# Patient Record
Sex: MALE | Race: WHITE | NOT HISPANIC OR LATINO | Employment: OTHER | URBAN - METROPOLITAN AREA
[De-identification: names, ages, dates, MRNs, and addresses within clinical notes are randomized per-mention and may not be internally consistent; named-entity substitution may affect disease eponyms.]

---

## 2018-12-27 RX ORDER — PRAVASTATIN SODIUM 10 MG
10 TABLET ORAL DAILY
COMMUNITY
Start: 2011-11-02 | End: 2021-04-05

## 2018-12-27 RX ORDER — BLOOD SUGAR DIAGNOSTIC
STRIP MISCELLANEOUS
COMMUNITY
Start: 2011-08-31

## 2018-12-27 RX ORDER — LORAZEPAM 1 MG/1
TABLET ORAL
COMMUNITY
Start: 2011-11-13 | End: 2019-01-09 | Stop reason: CLARIF

## 2018-12-27 RX ORDER — ESCITALOPRAM OXALATE 20 MG/1
1 TABLET ORAL DAILY
COMMUNITY
Start: 2011-11-13 | End: 2021-12-15 | Stop reason: ALTCHOICE

## 2018-12-27 RX ORDER — LISINOPRIL 20 MG/1
20 TABLET ORAL DAILY
COMMUNITY
Start: 2011-10-04 | End: 2021-04-05

## 2018-12-27 RX ORDER — TIMOLOL MALEATE 5 MG/ML
SOLUTION/ DROPS OPHTHALMIC
COMMUNITY
Start: 2011-09-07 | End: 2019-01-09 | Stop reason: CLARIF

## 2019-01-09 ENCOUNTER — OFFICE VISIT (OUTPATIENT)
Dept: CARDIOLOGY CLINIC | Facility: CLINIC | Age: 69
End: 2019-01-09
Payer: COMMERCIAL

## 2019-01-09 VITALS
DIASTOLIC BLOOD PRESSURE: 88 MMHG | SYSTOLIC BLOOD PRESSURE: 148 MMHG | HEART RATE: 57 BPM | WEIGHT: 143.3 LBS | BODY MASS INDEX: 23.88 KG/M2 | HEIGHT: 65 IN

## 2019-01-09 DIAGNOSIS — I51.81 TAKOTSUBO CARDIOMYOPATHY: Primary | ICD-10-CM

## 2019-01-09 DIAGNOSIS — I70.8 ATHEROSCLEROSIS OF ILIAC ARTERY: ICD-10-CM

## 2019-01-09 DIAGNOSIS — Z98.890 HISTORY OF CARDIAC CATH: ICD-10-CM

## 2019-01-09 DIAGNOSIS — I10 ESSENTIAL HYPERTENSION: ICD-10-CM

## 2019-01-09 PROCEDURE — 99214 OFFICE O/P EST MOD 30 MIN: CPT | Performed by: INTERNAL MEDICINE

## 2019-01-09 PROCEDURE — 93000 ELECTROCARDIOGRAM COMPLETE: CPT | Performed by: INTERNAL MEDICINE

## 2019-01-09 RX ORDER — INSULIN GLARGINE 300 U/ML
INJECTION, SOLUTION SUBCUTANEOUS
COMMUNITY
Start: 2018-12-09 | End: 2020-11-16

## 2019-01-09 RX ORDER — METFORMIN HYDROCHLORIDE 500 MG/1
1000 TABLET, EXTENDED RELEASE ORAL 2 TIMES DAILY WITH MEALS
COMMUNITY
Start: 2018-11-06 | End: 2021-04-05

## 2019-01-09 RX ORDER — LATANOPROST 50 UG/ML
1 SOLUTION/ DROPS OPHTHALMIC
COMMUNITY
End: 2020-09-02 | Stop reason: SDUPTHER

## 2019-01-09 RX ORDER — INSULIN ASPART 100 [IU]/ML
INJECTION, SOLUTION INTRAVENOUS; SUBCUTANEOUS
COMMUNITY
Start: 2018-12-31 | End: 2020-08-03 | Stop reason: SDUPTHER

## 2019-01-09 NOTE — PROGRESS NOTES
Cardiology Consultation      Jai Comeramp  1950  [id number]  [facility] Roseanna Easton [address]  [facility]    Reason: h/o takotsubo cm,   Requesting: Stacey Wallace MD    1  Takotsubo cardiomyopathy     2  Essential hypertension  POCT ECG   3  Atherosclerosis of iliac artery     4  History of cardiac cath          History:     Patient is a 61-year-old male referred for consultation with history of takotsubo cardiomyopathy 2010 where he experienced an episode of chest discomfort after running out of insulin  Cardiac catheterization showed no epicardial obstruction and was diagnosed with stress-induced cardiomyopathy/takotsubo cardiomyopathy  He has followed with the stents since then  Ejection fraction has been in the low 50% range with no significant valvular heart disease  Patient recently underwent stress testing September 2018 and by report suggestive of inferior scar with ejection fraction 51%  Echocardiogram performed that same day did not show any evidence of wall motion abnormality with ejection fraction 51%  He has had iliac duplex in the past showing mild atherosclerotic disease  He has had no claudication  He has essential hypertension and takes metformin for noninsulin requiring diabetes mellitus  He is an ex-smoker and quit in 2017  He takes low-dose pravastatin for hyperlipidemia  Admits to occasional fatigue, doesn't exercise routinely, walks dog on occasion        Patient Active Problem List   Diagnosis    Takotsubo cardiomyopathy    Essential hypertension    History of cardiac cath    Atherosclerosis of iliac artery     Past Medical History:   Diagnosis Date    Diabetes mellitus ([facility] )     Heart murmur     Hyperlipidemia     Peripheral vascular disease ([facility] )     Stroke [facility])      Social History     Social History    Marital status: /Civil Union     Spouse name: N/A    Number of children: N/A    Years of education: N/A     Occupational History    Not on file  Social History Main Topics    Smoking status: Former Smoker     Packs/day: 1 00     Types: Cigarettes    Smokeless tobacco: Never Used      Comment: 1 PPW    Alcohol use Yes    Drug use: Unknown    Sexual activity: Not on file     Other Topics Concern    Not on file     Social History Narrative    No narrative on file      Family History   Problem Relation Age of Onset    Cancer Father         lymphoma, brain, lung & liver    Mental illness Father     Depression Father     Depression Sister     Depression Brother     Stroke Maternal Grandfather     Stroke Paternal Grandmother     Diabetes Paternal Grandmother     Heart attack Paternal Grandmother      No past surgical history on file      Current Outpatient Prescriptions:     aspirin 81 MG tablet, Take 1 tablet by mouth daily, Disp: , Rfl:     escitalopram (LEXAPRO) 20 mg tablet, Take 1 tablet by mouth daily, Disp: , Rfl:     glucose blood (ANTONETTE CONTOUR TEST) test strip, by In Vitro route, Disp: , Rfl:     insulin detemir (LEVEMIR) 100 units/mL subcutaneous injection, Inject under the skin, Disp: , Rfl:     Insulin Syringe-Needle U-100 (RELION INSULIN SYR 0 3ML/31G) 31G X 5/16" 0 3 ML MISC, by Does not apply route, Disp: , Rfl:     latanoprost (XALATAN) 0 005 % ophthalmic solution, 1 drop daily at bedtime, Disp: , Rfl:     lisinopril (ZESTRIL) 20 mg tablet, Take 20 mg by mouth daily  , Disp: , Rfl:     metFORMIN (GLUCOPHAGE-XR) 500 mg 24 hr tablet, 1,000 mg 2 (two) times a day with meals  , Disp: , Rfl:     metoprolol tartrate (LOPRESSOR) 25 mg tablet, Take 25 mg by mouth every 12 (twelve) hours  , Disp: , Rfl:     NOVOLOG FLEXPEN 100 units/mL injection pen, , Disp: , Rfl:     pravastatin (PRAVACHOL) 10 mg tablet, Take 10 mg by mouth daily  , Disp: , Rfl:     TOUJEO SOLOSTAR 300 units/mL CONCETRATED U-300 injection pen, , Disp: , Rfl:   Allergies Allergen Reactions    Penicillins Anaphylaxis     Anaphylaxis  Reaction Date: 28Jun2007;        Social, Family and medication history as listed, reviewed and updated as necessary    Labs: No results found for: NA, K, CL, CO2, BUN, CREATININE, GLUCOSE, CALCIUM    No results found for: WBC, HGB, HCT, PLT    No results found for: CHOL  No results found for: HDL  No results found for: LDLCALC  No results found for: TRIG  No results found for: LDLDIRECT    No results found for: ALT, AST          No results found for: NTBNP    No results found for: HGBA1C    Imaging: Reviewed in epic      Review of Systems:  14 systems reviewed and negative with exception of the above         PHYSICAL EXAM:        Vitals:    01/09/19 1409   BP: 148/88   Pulse: 57     Body mass index is 23 85 kg/m²  Weight (last 2 days)     Date/Time   Weight    01/09/19 1409  65 (143 3)                 Gen: No acute distress  HEENT: anicteric, mucous membranes moist  Neck: supple, no jugular venous distention, or carotid bruit  Heart: regular, normal s1 and s2, no murmur/rub or gallop  Lungs :clear to auscultation bilaterally, no rales/rhonchi or wheeze  Abdomen: soft nontender, normoactive bowel sounds, no organomegaly  Ext: warm and perfused, normal femoral pulses, no edema, or clubbing  Skin: warm, no rashes  Neuro: AAO x 3, no focal findings  Psychiatric: normal affect  Musculoskeletal: no obvious joint deformities  Discussion/Summary:      1  H/o Takotsubo CM 2010  A  Cath [facility] 2010 no epicardial obstruction    2  Insulin requiring DM    3  Recent nuclear stress with inferior scar, likely artifactual (echo normal wall motion)    4  Mild iliac atherosclerosis, per [facility] vascular report for AAA screen      Plan:  Patient has been doing clinically well since 2010 where he presented with Takotsubo's cardiomyopathy  Cardiac catheterization no epicardial coronary obstruction    He has had surveillance stress testing since that time was recently showing ejection fraction in the 50-55% range  Official report with no axis to the images show inferior scar parentheses likely artifactual on the basis of the echocardiogram reading showing no area of hypokinesis in this territory and with no history of epicardial coronary obstruction)  He is asymptomatic  Encouraged continued sensible/Mediterranean type diet and will follow-up imaging next year  Will try to obtain labs regarding cholesterol status as he is only on pravastatin 10 mg daily but prefers to keep since he has had no side effects and states his cholesterol is well controlled

## 2019-01-17 ENCOUNTER — HOSPITAL ENCOUNTER (OUTPATIENT)
Dept: NON INVASIVE DIAGNOSTICS | Facility: HOSPITAL | Age: 69
Discharge: HOME | End: 2019-01-17
Payer: COMMERCIAL

## 2019-01-17 DIAGNOSIS — I51.81 TAKOTSUBO CARDIOMYOPATHY: ICD-10-CM

## 2019-01-17 DIAGNOSIS — I10 ESSENTIAL HYPERTENSION: ICD-10-CM

## 2019-01-17 PROCEDURE — 93306 TTE W/DOPPLER COMPLETE: CPT

## 2019-01-18 PROCEDURE — 93306 TTE W/DOPPLER COMPLETE: CPT | Performed by: INTERNAL MEDICINE

## 2020-08-03 DIAGNOSIS — E10.9 TYPE 1 DIABETES MELLITUS WITHOUT COMPLICATION (HCC): ICD-10-CM

## 2020-08-03 DIAGNOSIS — E10.9 TYPE 1 DIABETES MELLITUS WITHOUT COMPLICATION (HCC): Primary | ICD-10-CM

## 2020-08-03 RX ORDER — INSULIN ASPART 100 [IU]/ML
INJECTION, SOLUTION INTRAVENOUS; SUBCUTANEOUS
Qty: 1 PEN | Refills: 0 | Status: SHIPPED | OUTPATIENT
Start: 2020-08-03 | End: 2021-05-21 | Stop reason: SDUPTHER

## 2020-08-03 RX ORDER — INSULIN ASPART 100 [IU]/ML
INJECTION, SOLUTION INTRAVENOUS; SUBCUTANEOUS
Qty: 3 PEN | Refills: 0 | Status: SHIPPED | OUTPATIENT
Start: 2020-08-03 | End: 2020-08-03 | Stop reason: SDUPTHER

## 2020-08-14 RX ORDER — PEN NEEDLE, DIABETIC 31 GX5/16"
NEEDLE, DISPOSABLE MISCELLANEOUS
COMMUNITY
Start: 2020-08-10 | End: 2020-09-02 | Stop reason: SDUPTHER

## 2020-08-31 RX ORDER — BLOOD PRESSURE TEST KIT
KIT MISCELLANEOUS
Qty: 100 EACH | OUTPATIENT
Start: 2020-08-31

## 2020-08-31 RX ORDER — BLOOD-GLUCOSE CONTROL, LOW
EACH MISCELLANEOUS
Qty: 1 EACH | OUTPATIENT
Start: 2020-08-31

## 2020-09-01 RX ORDER — LATANOPROST 50 UG/ML
SOLUTION/ DROPS OPHTHALMIC
Qty: 3 ML | OUTPATIENT
Start: 2020-09-01

## 2020-09-02 DIAGNOSIS — Z79.4 TYPE 2 DIABETES MELLITUS WITH HYPERGLYCEMIA, WITH LONG-TERM CURRENT USE OF INSULIN (HCC): Primary | ICD-10-CM

## 2020-09-02 DIAGNOSIS — E11.65 TYPE 2 DIABETES MELLITUS WITH HYPERGLYCEMIA, WITH LONG-TERM CURRENT USE OF INSULIN (HCC): Primary | ICD-10-CM

## 2020-09-02 DIAGNOSIS — H40.9 GLAUCOMA OF BOTH EYES, UNSPECIFIED GLAUCOMA TYPE: ICD-10-CM

## 2020-09-02 RX ORDER — PEN NEEDLE, DIABETIC 31 GX5/16"
NEEDLE, DISPOSABLE MISCELLANEOUS
Qty: 300 EACH | Refills: 11 | Status: SHIPPED | OUTPATIENT
Start: 2020-09-02 | End: 2021-10-04

## 2020-09-02 RX ORDER — GLUCOSAMINE HCL/CHONDROITIN SU 500-400 MG
CAPSULE ORAL
Qty: 300 EACH | Refills: 11 | Status: SHIPPED | OUTPATIENT
Start: 2020-09-02 | End: 2021-10-28

## 2020-09-02 RX ORDER — LATANOPROST 50 UG/ML
1 SOLUTION/ DROPS OPHTHALMIC
Qty: 7.5 ML | Refills: 1 | Status: SHIPPED | OUTPATIENT
Start: 2020-09-02

## 2020-10-21 DIAGNOSIS — E10.9 TYPE 1 DIABETES MELLITUS WITHOUT COMPLICATION (HCC): ICD-10-CM

## 2020-10-21 DIAGNOSIS — E11.65 TYPE 2 DIABETES MELLITUS WITH HYPERGLYCEMIA, WITH LONG-TERM CURRENT USE OF INSULIN (HCC): Primary | ICD-10-CM

## 2020-10-21 DIAGNOSIS — Z79.4 TYPE 2 DIABETES MELLITUS WITH HYPERGLYCEMIA, WITH LONG-TERM CURRENT USE OF INSULIN (HCC): Primary | ICD-10-CM

## 2020-10-22 RX ORDER — BLOOD SUGAR DIAGNOSTIC
STRIP MISCELLANEOUS
Qty: 400 EACH | Refills: 11 | Status: SHIPPED | OUTPATIENT
Start: 2020-10-22 | End: 2021-08-20 | Stop reason: SDUPTHER

## 2020-11-16 DIAGNOSIS — E10.9 TYPE 1 DIABETES MELLITUS WITHOUT COMPLICATION (HCC): Primary | ICD-10-CM

## 2020-11-16 RX ORDER — INSULIN GLARGINE 300 U/ML
INJECTION, SOLUTION SUBCUTANEOUS
Qty: 3 PEN | Refills: 0 | Status: SHIPPED | OUTPATIENT
Start: 2020-11-16 | End: 2021-05-21

## 2021-02-26 DIAGNOSIS — H40.9 GLAUCOMA OF BOTH EYES, UNSPECIFIED GLAUCOMA TYPE: ICD-10-CM

## 2021-02-26 RX ORDER — LATANOPROST 50 UG/ML
1 SOLUTION/ DROPS OPHTHALMIC
OUTPATIENT
Start: 2021-02-26

## 2021-04-03 DIAGNOSIS — Z79.4 TYPE 2 DIABETES MELLITUS WITH HYPERGLYCEMIA, WITH LONG-TERM CURRENT USE OF INSULIN (HCC): Primary | ICD-10-CM

## 2021-04-03 DIAGNOSIS — E11.65 TYPE 2 DIABETES MELLITUS WITH HYPERGLYCEMIA, WITH LONG-TERM CURRENT USE OF INSULIN (HCC): Primary | ICD-10-CM

## 2021-04-05 RX ORDER — PRAVASTATIN SODIUM 10 MG
TABLET ORAL
Qty: 90 TABLET | Refills: 1 | Status: SHIPPED | OUTPATIENT
Start: 2021-04-05 | End: 2021-10-28

## 2021-04-05 RX ORDER — LISINOPRIL 20 MG/1
TABLET ORAL
Qty: 90 TABLET | Refills: 1 | Status: SHIPPED | OUTPATIENT
Start: 2021-04-05 | End: 2021-10-28

## 2021-04-05 RX ORDER — METFORMIN HYDROCHLORIDE 500 MG/1
TABLET, EXTENDED RELEASE ORAL
Qty: 360 TABLET | Refills: 1 | Status: SHIPPED | OUTPATIENT
Start: 2021-04-05 | End: 2021-10-28

## 2021-04-20 DIAGNOSIS — I10 ESSENTIAL HYPERTENSION: Primary | ICD-10-CM

## 2021-04-20 NOTE — TELEPHONE ENCOUNTER
Patient is requesting a refill to local and to [facility]   However patient has not been seen in over 1 year     So should really only go tolocal to get him through until appointment in May

## 2021-05-17 ENCOUNTER — RA CDI HCC (OUTPATIENT)
Dept: OTHER | Facility: HOSPITAL | Age: 71
End: 2021-05-17

## 2021-05-17 NOTE — PROGRESS NOTES
[facility]  coding opportunities             Chart reviewed, (number of) suggestions sent to provider: 1           Patients insurance company: [employer] (Medicare Advantage only)        DX: [id number] Type 2 diabetes mellitus with diabetic peripheral angiopathy without gangrene     Provider never responded to [place] [id number]  coding request and DX was not used

## 2021-05-21 ENCOUNTER — OFFICE VISIT (OUTPATIENT)
Dept: FAMILY MEDICINE CLINIC | Facility: CLINIC | Age: 71
End: 2021-05-21
Payer: COMMERCIAL

## 2021-05-21 VITALS
SYSTOLIC BLOOD PRESSURE: 128 MMHG | BODY MASS INDEX: 22.82 KG/M2 | DIASTOLIC BLOOD PRESSURE: 82 MMHG | WEIGHT: 137 LBS | HEIGHT: 65 IN

## 2021-05-21 DIAGNOSIS — E53.8 B12 DEFICIENCY: ICD-10-CM

## 2021-05-21 DIAGNOSIS — Z12.5 SCREENING FOR PROSTATE CANCER: ICD-10-CM

## 2021-05-21 DIAGNOSIS — Z12.11 SCREENING FOR MALIGNANT NEOPLASM OF COLON: ICD-10-CM

## 2021-05-21 DIAGNOSIS — I10 ESSENTIAL HYPERTENSION: ICD-10-CM

## 2021-05-21 DIAGNOSIS — Z79.899 OTHER LONG TERM (CURRENT) DRUG THERAPY: ICD-10-CM

## 2021-05-21 DIAGNOSIS — Z11.59 ENCOUNTER FOR HEPATITIS C SCREENING TEST FOR LOW RISK PATIENT: ICD-10-CM

## 2021-05-21 DIAGNOSIS — E10.9 TYPE 1 DIABETES MELLITUS WITHOUT COMPLICATION (HCC): ICD-10-CM

## 2021-05-21 DIAGNOSIS — E55.9 VITAMIN D DEFICIENCY: ICD-10-CM

## 2021-05-21 DIAGNOSIS — Z00.00 WELL ADULT EXAM: Primary | ICD-10-CM

## 2021-05-21 PROCEDURE — 3288F FALL RISK ASSESSMENT DOCD: CPT | Performed by: FAMILY MEDICINE

## 2021-05-21 PROCEDURE — G0439 PPPS, SUBSEQ VISIT: HCPCS | Performed by: FAMILY MEDICINE

## 2021-05-21 PROCEDURE — 1125F AMNT PAIN NOTED PAIN PRSNT: CPT | Performed by: FAMILY MEDICINE

## 2021-05-21 PROCEDURE — 3008F BODY MASS INDEX DOCD: CPT | Performed by: FAMILY MEDICINE

## 2021-05-21 PROCEDURE — 1160F RVW MEDS BY RX/DR IN RCRD: CPT | Performed by: FAMILY MEDICINE

## 2021-05-21 PROCEDURE — 99214 OFFICE O/P EST MOD 30 MIN: CPT | Performed by: FAMILY MEDICINE

## 2021-05-21 PROCEDURE — 1170F FXNL STATUS ASSESSED: CPT | Performed by: FAMILY MEDICINE

## 2021-05-21 PROCEDURE — 3725F SCREEN DEPRESSION PERFORMED: CPT | Performed by: FAMILY MEDICINE

## 2021-05-21 RX ORDER — INSULIN GLARGINE 300 U/ML
40 INJECTION, SOLUTION SUBCUTANEOUS
Qty: 6 ML | Refills: 11 | Status: SHIPPED | OUTPATIENT
Start: 2021-05-21 | End: 2022-06-30

## 2021-05-21 RX ORDER — INSULIN ASPART 100 [IU]/ML
INJECTION, SOLUTION INTRAVENOUS; SUBCUTANEOUS
Qty: 9 ML | Refills: 11 | Status: SHIPPED | OUTPATIENT
Start: 2021-05-21 | End: 2022-05-02

## 2021-05-21 NOTE — PROGRESS NOTES
Assessment and Plan:     Problem List Items Addressed This Visit        Cardiovascular and Mediastinum    Essential hypertension    Relevant Medications    metoprolol tartrate (LOPRESSOR) 25 mg tablet      Other Visit Diagnoses     Well adult exam    -  Primary    Type 1 diabetes mellitus without complication (HCC)        Relevant Medications    insulin glargine (Toujeo SoloStar) 300 units/mL CONCENTRATED U-300 injection pen (1-unit dial)    NovoLOG FlexPen 100 units/mL injection pen    Other Relevant Orders    TSH, 3rd generation with Free T4 reflex    Lipid panel    Comprehensive metabolic panel    CBC    UA w Reflex to Microscopic w Reflex to Culture    Hemoglobin A1C    Microalbumin / creatinine urine ratio    B12 deficiency        Relevant Orders    Vitamin B12    Vitamin D deficiency        Relevant Orders    Vitamin D 25 hydroxy    Other long term (current) drug therapy        Relevant Orders    Hepatitis C antibody    Encounter for hepatitis C screening test for low risk patient        Screening for malignant neoplasm of colon        Relevant Orders    Ambulatory referral for colonoscopy    Screening for prostate cancer        Relevant Orders    PSA, Total Screen           Preventive health issues were discussed with patient, and age appropriate screening tests were ordered as noted in patient's After Visit Summary  Personalized health advice and appropriate referrals for health education or preventive services given if needed, as noted in patient's After Visit Summary       History of Present Illness:     Patient presents for Medicare Annual Wellness visit    Patient Care Team:  Chaitanya Greene MD as PCP - General     Problem List:     Patient Active Problem List   Diagnosis    Takotsubo cardiomyopathy    Essential hypertension    History of cardiac cath    Atherosclerosis of iliac artery      Past Medical and Surgical History:     Past Medical History:   Diagnosis Date    Anxiety     Arthritis     Depression     Diabetes mellitus [facility]     Heart attack ([facility]     Heart murmur     Hepatitis     Hyperlipidemia     Palpitations     Peripheral vascular disease (HCC)     Stroke [facility])      Past Surgical History:   Procedure Laterality Date    HERNIA REPAIR      KNEE SURGERY Right 1977    Tendon      Family History:     Family History   Problem Relation Age of Onset    Cancer Father         lymphoma, brain, lung & liver    Mental illness Father     Depression Father     Depression Sister     Depression Brother     Stroke Maternal Grandfather     Stroke Paternal Grandmother     Diabetes Paternal Grandmother     Heart attack Paternal Grandmother     Depression Brother       Social History:        Social History     Socioeconomic History    Marital status: /Civil Union     Spouse name: None    Number of children: None    Years of education: None    Highest education level: None   Occupational History    Occupation: Retired    Social Needs    Financial resource strain: None    Food insecurity     Worry: None     Inability: None    Transportation needs     Medical: None     Non-medical: None   Tobacco Use    Smoking status: Former Smoker     Packs/day: 1 00     Years: 50 00     Pack years: 50 00     Types: Cigarettes    Smokeless tobacco: Never Used    Tobacco comment: 1 PPW   Substance and Sexual Activity    Alcohol use:  Yes    Drug use: None     Comment: Denied    Sexual activity: None   Lifestyle    Physical activity     Days per week: None     Minutes per session: None    Stress: None   Relationships    Social connections     Talks on phone: None     Gets together: None     Attends Yazidism service: None     Active member of club or organization: None     Attends meetings of clubs or organizations: None     Relationship status: None    Intimate partner violence     Fear of current or ex partner: None     Emotionally abused: None     Physically abused: None     Forced sexual activity: None   Other Topics Concern    None   Social History Narrative    Most recent tobacco use screenin2020    Do you currently or have you served in the [employer]: No    Were you activated, into active duty, as a member of the [employer] or as a Reservist: No    Marital status: Single    Alcohol intake: Occasional    Live alone or with others: alone    High cholesterol: Yes    High blood pressure: Yes    Exercise level: None    low level    Chewing tobacco: none    Overweight: No    Obese: No    Diabetes: Yes    General stress level: Low    Diet: Diabetic    Occupation: Retired    Advance directive: No    Caffeine intake:  Moderate    1-2 cups of coffee, No soda, occasional Ice tea    Guns present in home: No    Seat belts used routinely: No    Sunscreen used routinely: No    Seat belt/car seat used routinely: Yes    Do you have regular medical check ups: Yes    Do you have regular dental check ups: Yes    Education: 12    Illicit drugs: Denied    Smoke alarm in home: Yes    Salt Intake: Normal Use    Has the Patient had a mammogram to screen for breast cancer within 24 months: No    Would the patient like to schedule a Mammogram: No      Medications and Allergies:     Current Outpatient Medications   Medication Sig Dispense Refill    Accu-Chek Marya Plus test strip TEST BLOOD GLUCOSE FOUR TIMES DAILY 400 each 11    Alcohol Swabs 70 % PADS Use when injecting qid 300 each 11    aspirin 81 MG tablet Take 1 tablet by mouth daily      Droplet Pen Needles 31G X 8 MM MISC Use to injected qid 300 each 11    escitalopram (LEXAPRO) 20 mg tablet Take 1 tablet by mouth daily      insulin glargine (Toujeo SoloStar) 300 units/mL CONCENTRATED U-300 injection pen (1-unit dial) Inject 40 Units under the skin daily at bedtime 6 mL 11    Insulin Syringe-Needle U-100 (RELION INSULIN SYR 0 3ML/31G) 31G X 5/16" 0 3 ML MISC by Does not apply route      latanoprost (XALATAN) 0 005 % ophthalmic solution Administer 1 drop to both eyes daily at bedtime 7 5 mL 1    lisinopril (ZESTRIL) 20 mg tablet TAKE 1 TABLET EVERY DAY 90 tablet 1    metFORMIN (GLUCOPHAGE-XR) 500 mg 24 hr tablet TAKE 2 TABLETS TWICE DAILY 360 tablet 1    metoprolol tartrate (LOPRESSOR) 25 mg tablet Take 1 tablet (25 mg total) by mouth every 12 (twelve) hours 14 tablet 0    NovoLOG FlexPen 100 units/mL injection pen 15 units 3 times daily 9 mL 11    pravastatin (PRAVACHOL) 10 mg tablet TAKE 1 TABLET EVERY DAY 90 tablet 1     No current facility-administered medications for this visit  Allergies   Allergen Reactions    Penicillins Anaphylaxis     Anaphylaxis  Reaction Date: 28Jun2007;       Immunizations:     Immunization History   Administered Date(s) Administered    INFLUENZA 11/20/2014    Influenza, seasonal, injectable 11/05/2007    Pneumococcal Polysaccharide PPV23 02/14/2018    SARS-CoV-2 / COVID-19 mRNA IM (Pfizer-BioNTech) 04/21/2021      Health Maintenance:         Topic Date Due    Hepatitis C Screening  Never done    Colorectal Cancer Screening  Never done         Topic Date Due    COVID-19 Vaccine (2 - Pfizer 2-dose series) 05/12/2021      Medicare Health Risk Assessment:     /82 (BP Location: Left arm, Patient Position: Sitting, Cuff Size: Large)   Ht 5' 5" (1 651 m)   Wt 62 1 kg (137 lb)   BMI 22 80 kg/m²      Pranav Velasquez is here for his Subsequent Wellness visit  Last Medicare Wellness visit information reviewed, patient interviewed and updates made to the record today  Health Risk Assessment:   Patient rates overall health as good  Patient feels that their physical health rating is same  Patient is satisfied with their life  Eyesight was rated as same  Hearing was rated as same  Patient feels that their emotional and mental health rating is same  Patients states they are never, rarely angry  Patient states they are sometimes unusually tired/fatigued   Pain experienced in the last 7 days has been some  Patient's pain rating has been 3/10  Patient states that he has experienced no weight loss or gain in last 6 months  Depression Screening:   PHQ-2 Score: 0      Fall Risk Screening: In the past year, patient has experienced: no history of falling in past year      Home Safety:  Patient does not have trouble with stairs inside or outside of their home  Patient has working smoke alarms Home safety hazards include: none  Medications:   Patient is currently taking over-the-counter supplements  OTC medications include: see medication list  Patient is able to manage medications  Activities of Daily Living (ADLs)/Instrumental Activities of Daily Living (IADLs):   Walk and transfer into and out of bed and chair?: Yes  Dress and groom yourself?: Yes    Bathe or shower yourself?: Yes    Feed yourself? Yes  Do your laundry/housekeeping?: Yes  Manage your money, pay your bills and track your expenses?: Yes  Make your own meals?: Yes    Do your own shopping?: Yes    Advance Care Planning:     Five wishes given: Yes      Cognitive Screening:   Provider or family/friend/caregiver concerned regarding cognition?: No    PREVENTIVE SCREENINGS      Cardiovascular Screening:      Due for: Lipid Panel      Diabetes Screening:     General: Screening Not Indicated and History Diabetes    Due for: Blood Glucose      Colorectal Cancer Screening:       Due for: Colonoscopy - Low Risk      Prostate Cancer Screening:      Due for: PSA      Osteoporosis Screening:    General: Screening Not Indicated      Abdominal Aortic Aneurysm (AAA) Screening:    Risk factors include: age between 73-69 yo and tobacco use        Lung Cancer Screening:     General: Patient Declines    Due for: Low Dose CT (LDCT)      Hepatitis C Screening:    General: Patient Declines    Screening, Brief Intervention, and Referral to Treatment (SBIRT)      Brief Intervention  Alcohol & drug use screenings were reviewed   No concerns regarding substance use disorder identified         Pradeep Stewart MD

## 2021-05-21 NOTE — PATIENT INSTRUCTIONS
Medicare Preventive Visit Patient Instructions  Thank you for completing your Welcome to Medicare Visit or Medicare Annual Wellness Visit today  Your next wellness visit will be due in one year (5/22/2022)  The screening/preventive services that you may require over the next 5-10 years are detailed below  Some tests may not apply to you based off risk factors and/or age  Screening tests ordered at today's visit but not completed yet may show as past due  Also, please note that scanned in results may not display below  Preventive Screenings:  Service Recommendations Previous Testing/Comments   Colorectal Cancer Screening  · Colonoscopy    · Fecal Occult Blood Test (FOBT)/Fecal Immunochemical Test (FIT)  · Fecal DNA/Cologuard Test  · Flexible Sigmoidoscopy Age: 54-65 years old   Colonoscopy: every 10 years (May be performed more frequently if at higher risk)  OR  FOBT/FIT: every 1 year  OR  Cologuard: every 3 years  OR  Sigmoidoscopy: every 5 years  Screening may be recommended earlier than age 48 if at higher risk for colorectal cancer  Also, an individualized decision between you and your healthcare provider will decide whether screening between the ages of 74-80 would be appropriate   Colonoscopy: Not on file  FOBT/FIT: Not on file  Cologuard: Not on file  Sigmoidoscopy: Not on file          Prostate Cancer Screening Individualized decision between patient and health care provider in men between ages of 53-78   Medicare will cover every 12 months beginning on the day after your 50th birthday PSA: No results in last 5 years           Hepatitis C Screening Once for adults born between [birth year] and 1965  More frequently in patients at high risk for Hepatitis C Hep C Antibody: Not on file        Diabetes Screening 1-2 times per year if you're at risk for diabetes or have pre-diabetes Fasting glucose: No results in last 5 years   A1C: No results in last 5 years    Screening Not Indicated  History Diabetes   Cholesterol Screening Once every 5 years if you don't have a lipid disorder  May order more often based on risk factors  Lipid panel: Not on file           Other Preventive Screenings Covered by Medicare:  1  Abdominal Aortic Aneurysm (AAA) Screening: covered once if your at risk  You're considered to be at risk if you have a family history of AAA or a male between the age of 73-68 who smoking at least 100 cigarettes in your lifetime  2  Lung Cancer Screening: covers low dose CT scan once per year if you meet all of the following conditions: (1) Age 50-69; (2) No signs or symptoms of lung cancer; (3) Current smoker or have quit smoking within the last 15 years; (4) You have a tobacco smoking history of at least 30 pack years (packs per day x number of years you smoked); (5) You get a written order from a healthcare provider  3  Glaucoma Screening: covered annually if you're considered high risk: (1) You have diabetes OR (2) Family history of glaucoma OR (3)  aged 48 and older OR (3)  American aged 72 and older  3  Osteoporosis Screening: covered every 2 years if you meet one of the following conditions: (1) Have a vertebral abnormality; (2) On glucocorticoid therapy for more than 3 months; (3) Have primary hyperparathyroidism; (4) On osteoporosis medications and need to assess response to drug therapy  5  HIV Screening: covered annually if you're between the age of 12-76  Also covered annually if you are younger than 13 and older than 72 with risk factors for HIV infection  For pregnant patients, it is covered up to 3 times per pregnancy      Immunizations:  Immunization Recommendations   Influenza Vaccine Annual influenza vaccination during flu season is recommended for all persons aged >= 6 months who do not have contraindications   Pneumococcal Vaccine (Prevnar and Pneumovax)  * Prevnar = PCV13  * Pneumovax = PPSV23 Adults 25-60 years old: 1-3 doses may be recommended based on certain risk factors  Adults 72 years old: Prevnar (PCV13) vaccine recommended followed by Pneumovax (PPSV23) vaccine  If already received PPSV23 since turning 65, then PCV13 recommended at least one year after PPSV23 dose  Hepatitis B Vaccine 3 dose series if at intermediate or high risk (ex: diabetes, end stage renal disease, liver disease)   Tetanus (Td) Vaccine - COST NOT COVERED BY MEDICARE PART B Following completion of primary series, a booster dose should be given every 10 years to maintain immunity against tetanus  Td may also be given as tetanus wound prophylaxis  Tdap Vaccine - COST NOT COVERED BY MEDICARE PART B Recommended at least once for all adults  For pregnant patients, recommended with each pregnancy  Shingles Vaccine (Shingrix) - COST NOT COVERED BY MEDICARE PART B  2 shot series recommended in those aged 48 and above     Health Maintenance Due:      Topic Date Due    Hepatitis C Screening  Never done    Colorectal Cancer Screening  Never done     Immunizations Due:      Topic Date Due    COVID-19 Vaccine (2 - Pfizer 2-dose series) 05/12/2021     Advance Directives   What are advance directives? Advance directives are legal documents that state your wishes and plans for medical care  These plans are made ahead of time in case you lose your ability to make decisions for yourself  Advance directives can apply to any medical decision, such as the treatments you want, and if you want to donate organs  What are the types of advance directives? There are many types of advance directives, and each state has rules about how to use them  You may choose a combination of any of the following:  · Living will: This is a written record of the treatment you want  You can also choose which treatments you do not want, which to limit, and which to stop at a certain time  This includes surgery, medicine, IV fluid, and tube feedings  · Durable power of  for healthcare [place] [facility]):   This is a written record that states who you want to make healthcare choices for you when you are unable to make them for yourself  This person, called a proxy, is usually a family member or a friend  You may choose more than 1 proxy  · Do not resuscitate (DNR) order:  A DNR order is used in case your heart stops beating or you stop breathing  It is a request not to have certain forms of treatment, such as CPR  A DNR order may be included in other types of advance directives  · Medical directive: This covers the care that you want if you are in a coma, near death, or unable to make decisions for yourself  You can list the treatments you want for each condition  Treatment may include pain medicine, surgery, blood transfusions, dialysis, IV or tube feedings, and a ventilator (breathing machine)  · Values history: This document has questions about your views, beliefs, and how you feel and think about life  This information can help others choose the care that you would choose  Why are advance directives important? An advance directive helps you control your care  Although spoken wishes may be used, it is better to have your wishes written down  Spoken wishes can be misunderstood, or not followed  Treatments may be given even if you do not want them  An advance directive may make it easier for your family to make difficult choices about your care  © Copyright [employer] 2018 Information is for End User's use only and may not be sold, redistributed or otherwise used for commercial purposes   All illustrations and images included in CareNotes® are the copyrighted property of [employer]  or [address] [place] [address] [employer]

## 2021-05-21 NOTE — PROGRESS NOTES
Assessment/Plan:    1  Type 1 diabetes mellitus without complication (HCC)  -     TSH, 3rd generation with Free T4 reflex; Future; Expected date: 05/21/2021  -     Lipid panel; Future; Expected date: 05/21/2021  -     Comprehensive metabolic panel; Future; Expected date: 05/21/2021  -     CBC; Future; Expected date: 05/21/2021  -     UA w Reflex to Microscopic w Reflex to Culture; Future; Expected date: 05/21/2021  -     Hemoglobin A1C; Future; Expected date: 05/21/2021  -     Microalbumin / creatinine urine ratio  -     insulin glargine (Toujeo SoloStar) 300 units/mL CONCENTRATED U-300 injection pen (1-unit dial); Inject 40 Units under the skin daily at bedtime  -     NovoLOG FlexPen 100 units/mL injection pen; 15 units 3 times daily    2  B12 deficiency  -     Vitamin B12; Future; Expected date: 05/21/2021    3  Vitamin D deficiency  -     Vitamin D 25 hydroxy; Future; Expected date: 05/21/2021    4  Other long term (current) drug therapy  -     Hepatitis C antibody; Future    5  Encounter for hepatitis C screening test for low risk patient    6  Screening for malignant neoplasm of colon  -     Ambulatory referral for colonoscopy; Future    7  Screening for prostate cancer  -     PSA, Total Screen; Future         Subjective:      Patient ID: Renzo Valencia is a 78 y o  male  HPI   DM 1  On basal bolus       The following portions of the patient's history were reviewed and updated as appropriate: allergies, current medications, past family history, past medical history, past social history, past surgical history and problem list     Review of Systems   Constitutional: Negative for activity change, appetite change and fever  HENT: Negative for congestion, nosebleeds and trouble swallowing  Eyes: Negative for itching  Respiratory: Negative for cough and chest tightness  Cardiovascular: Negative for chest pain and palpitations     Gastrointestinal: Negative for abdominal pain, constipation, diarrhea and nausea  Endocrine: Negative for cold intolerance  Genitourinary: Negative for frequency  Musculoskeletal: Negative for gait problem and joint swelling  Skin: Negative for rash  Allergic/Immunologic: Negative for immunocompromised state  Neurological: Negative for dizziness, tremors, seizures, syncope and headaches  Psychiatric/Behavioral: Negative for hallucinations and suicidal ideas  Objective:      /82 (BP Location: Left arm, Patient Position: Sitting, Cuff Size: Large)   Ht 5' 5" (1 651 m)   Wt 62 1 kg (137 lb)   BMI 22 80 kg/m²     No visits with results within 2 Week(s) from this visit  Latest known visit with results is:   No results found for any previous visit  Physical Exam  Vitals signs and nursing note reviewed  Constitutional:       General: He is not in acute distress  Appearance: He is well-developed  HENT:      Head: Normocephalic and atraumatic  Neck:      Musculoskeletal: Normal range of motion and neck supple  Cardiovascular:      Rate and Rhythm: Normal rate and regular rhythm  Pulses: no weak pulses          Dorsalis pedis pulses are 2+ on the right side and 2+ on the left side  Heart sounds: Normal heart sounds  No murmur  Pulmonary:      Effort: Pulmonary effort is normal       Breath sounds: Normal breath sounds  No wheezing or rales  Abdominal:      General: Bowel sounds are normal  There is no distension  Palpations: Abdomen is soft  Tenderness: There is no abdominal tenderness  There is no guarding or rebound  Musculoskeletal: Normal range of motion  General: No tenderness  Feet:      Right foot:      Skin integrity: Callus and dry skin present  No ulcer, skin breakdown, erythema or warmth  Left foot:      Skin integrity: Callus and dry skin present  No ulcer, skin breakdown, erythema or warmth  Lymphadenopathy:      Cervical: No cervical adenopathy  Skin:     General: Skin is warm and dry  Capillary Refill: Capillary refill takes less than 2 seconds  Findings: No rash  Neurological:      Mental Status: He is alert and oriented to person, place, and time  Cranial Nerves: No cranial nerve deficit  Sensory: No sensory deficit  Motor: No abnormal muscle tone  Psychiatric:         Behavior: Behavior normal          Thought Content: Thought content normal          Judgment: Judgment normal          BMI Counseling: Body mass index is 22 8 kg/m²  The BMI is above normal  Nutrition recommendations include decreasing portion sizes, encouraging healthy choices of fruits and vegetables and limiting drinks that contain sugar  Exercise recommendations include moderate physical activity 150 minutes/week  No pharmacotherapy was ordered  Falls Plan of Care: balance, strength, and gait training instructions were provided  Medications that increase falls were reviewed  Patient's shoes and socks removed  Right Foot/Ankle   Right Foot Inspection  Skin Exam: skin normal, skin intact, dry skin, callus and callus no warmth, no erythema, no maceration, no abnormal color, no pre-ulcer and no ulcer                          Toe Exam: ROM and strength within normal limits  Sensory       Monofilament testing: intact  Vascular  Capillary refills: < 3 seconds  The right DP pulse is 2+  Left Foot/Ankle  Left Foot Inspection  Skin Exam: skin normal, skin intact, dry skin and callusno warmth, no erythema, no maceration, normal color, no pre-ulcer and no ulcer                         Toe Exam: ROM and strength within normal limits                   Sensory       Monofilament: intact  Vascular  Capillary refills: < 3 seconds  The left DP pulse is 2+  Assign Risk Category:  No deformity present; No loss of protective sensation;  No weak pulses       Risk: Winnie Ace 90, MD [place] 41

## 2021-07-06 ENCOUNTER — TELEPHONE (OUTPATIENT)
Dept: GASTROENTEROLOGY | Facility: CLINIC | Age: 71
End: 2021-07-06

## 2021-07-06 NOTE — TELEPHONE ENCOUNTER
Received order from Dr Indra Jeffers to call and schedule as a new patient colon screening  I left message for patient to call and schedule  Will call him again in 1 week if he doesn't return call

## 2021-07-13 DIAGNOSIS — I10 ESSENTIAL HYPERTENSION: ICD-10-CM

## 2021-07-13 NOTE — TELEPHONE ENCOUNTER
Patient called stating that he is completely out of his Metoprolol 25mg    He will need a short supply sent to [facility] and a 90 day sent to [facility]

## 2021-07-13 NOTE — TELEPHONE ENCOUNTER
Called and spoke with patient  Stated he is having difficult time of [employer] and his doctor working together to get prescription and as soon as he gets that resolved he will take care of all his appointments  Will wait for patient to call

## 2021-07-29 DIAGNOSIS — I10 ESSENTIAL HYPERTENSION: ICD-10-CM

## 2021-08-20 DIAGNOSIS — E10.9 TYPE 1 DIABETES MELLITUS WITHOUT COMPLICATION (HCC): ICD-10-CM

## 2021-08-20 RX ORDER — BLOOD SUGAR DIAGNOSTIC
1 STRIP MISCELLANEOUS 4 TIMES DAILY
Qty: 400 EACH | Refills: 11 | Status: SHIPPED | OUTPATIENT
Start: 2021-08-20 | End: 2021-08-20 | Stop reason: SDUPTHER

## 2021-08-20 RX ORDER — BLOOD SUGAR DIAGNOSTIC
1 STRIP MISCELLANEOUS 4 TIMES DAILY
Qty: 120 EACH | Refills: 0 | Status: SHIPPED | OUTPATIENT
Start: 2021-08-20

## 2021-08-20 RX ORDER — BLOOD SUGAR DIAGNOSTIC
1 STRIP MISCELLANEOUS 4 TIMES DAILY
Qty: 120 EACH | Refills: 0 | Status: SHIPPED | OUTPATIENT
Start: 2021-08-20 | End: 2021-08-20 | Stop reason: SDUPTHER

## 2021-08-20 NOTE — TELEPHONE ENCOUNTER
A  Number 90 was sent to [facility] and a number 30 was sent to [facility]

## 2021-09-07 DIAGNOSIS — E10.9 TYPE 1 DIABETES MELLITUS WITHOUT COMPLICATION (HCC): ICD-10-CM

## 2021-09-07 RX ORDER — BLOOD SUGAR DIAGNOSTIC
STRIP MISCELLANEOUS
Qty: 400 STRIP | Refills: 11 | Status: SHIPPED | OUTPATIENT
Start: 2021-09-07

## 2021-09-16 ENCOUNTER — RA CDI HCC (OUTPATIENT)
Dept: OTHER | Facility: HOSPITAL | Age: 71
End: 2021-09-16

## 2021-09-16 NOTE — PROGRESS NOTES
[facility] 75  coding opportunities       Chart reviewed, no opportunity found: CHART REVIEWED, NO OPPORTUNITY FOUND                        Patients insurance company: [facility], [facility]  - [facility] only)

## 2021-10-02 DIAGNOSIS — E11.65 TYPE 2 DIABETES MELLITUS WITH HYPERGLYCEMIA, WITH LONG-TERM CURRENT USE OF INSULIN (HCC): ICD-10-CM

## 2021-10-02 DIAGNOSIS — Z79.4 TYPE 2 DIABETES MELLITUS WITH HYPERGLYCEMIA, WITH LONG-TERM CURRENT USE OF INSULIN (HCC): ICD-10-CM

## 2021-10-04 RX ORDER — PEN NEEDLE, DIABETIC 31 GX5/16"
NEEDLE, DISPOSABLE MISCELLANEOUS
Qty: 400 EACH | Refills: 1 | Status: SHIPPED | OUTPATIENT
Start: 2021-10-04 | End: 2022-03-23

## 2021-11-15 ENCOUNTER — RA CDI HCC (OUTPATIENT)
Dept: OTHER | Facility: HOSPITAL | Age: 71
End: 2021-11-15

## 2021-11-18 DIAGNOSIS — Z79.4 TYPE 2 DIABETES MELLITUS WITH HYPERGLYCEMIA, WITH LONG-TERM CURRENT USE OF INSULIN (HCC): ICD-10-CM

## 2021-11-18 DIAGNOSIS — E11.65 TYPE 2 DIABETES MELLITUS WITH HYPERGLYCEMIA, WITH LONG-TERM CURRENT USE OF INSULIN (HCC): ICD-10-CM

## 2021-11-19 RX ORDER — ISOPROPYL ALCOHOL 0.75 G/1
SWAB TOPICAL
Qty: 200 EACH | Refills: 1 | Status: SHIPPED | OUTPATIENT
Start: 2021-11-19 | End: 2022-02-16

## 2021-11-29 DIAGNOSIS — I10 ESSENTIAL HYPERTENSION: ICD-10-CM

## 2021-11-29 DIAGNOSIS — E11.65 TYPE 2 DIABETES MELLITUS WITH HYPERGLYCEMIA, WITH LONG-TERM CURRENT USE OF INSULIN (HCC): ICD-10-CM

## 2021-11-29 DIAGNOSIS — Z79.4 TYPE 2 DIABETES MELLITUS WITH HYPERGLYCEMIA, WITH LONG-TERM CURRENT USE OF INSULIN (HCC): ICD-10-CM

## 2021-11-29 RX ORDER — METFORMIN HYDROCHLORIDE 500 MG/1
TABLET, EXTENDED RELEASE ORAL
Qty: 180 TABLET | Refills: 0 | Status: SHIPPED | OUTPATIENT
Start: 2021-11-29 | End: 2022-01-05

## 2021-12-08 ENCOUNTER — APPOINTMENT (OUTPATIENT)
Dept: LAB | Facility: CLINIC | Age: 71
End: 2021-12-08
Payer: COMMERCIAL

## 2021-12-08 DIAGNOSIS — Z12.5 SCREENING FOR PROSTATE CANCER: ICD-10-CM

## 2021-12-08 DIAGNOSIS — E55.9 VITAMIN D DEFICIENCY: ICD-10-CM

## 2021-12-08 DIAGNOSIS — E10.9 TYPE 1 DIABETES MELLITUS WITHOUT COMPLICATION (HCC): ICD-10-CM

## 2021-12-08 DIAGNOSIS — E53.8 B12 DEFICIENCY: ICD-10-CM

## 2021-12-08 DIAGNOSIS — Z79.899 OTHER LONG TERM (CURRENT) DRUG THERAPY: ICD-10-CM

## 2021-12-08 LAB
25(OH)D3 SERPL-MCNC: 13.2 NG/ML (ref 30–100)
ALBUMIN SERPL BCP-MCNC: 3.5 G/DL (ref 3.5–5)
ALP SERPL-CCNC: 31 U/L (ref 46–116)
ALT SERPL W P-5'-P-CCNC: 47 U/L (ref 12–78)
ANION GAP SERPL CALCULATED.3IONS-SCNC: 5 MMOL/L (ref 4–13)
AST SERPL W P-5'-P-CCNC: 20 U/L (ref 5–45)
BACTERIA UR QL AUTO: NORMAL /HPF
BILIRUB SERPL-MCNC: 0.42 MG/DL (ref 0.2–1)
BILIRUB UR QL STRIP: NEGATIVE
BUN SERPL-MCNC: 14 MG/DL (ref 5–25)
CALCIUM SERPL-MCNC: 8.7 MG/DL (ref 8.3–10.1)
CHLORIDE SERPL-SCNC: 105 MMOL/L (ref 100–108)
CHOLEST SERPL-MCNC: 117 MG/DL
CLARITY UR: CLEAR
CO2 SERPL-SCNC: 28 MMOL/L (ref 21–32)
COLOR UR: YELLOW
CREAT SERPL-MCNC: 0.76 MG/DL (ref 0.6–1.3)
CREAT UR-MCNC: 108 MG/DL
ERYTHROCYTE [DISTWIDTH] IN BLOOD BY AUTOMATED COUNT: 14 % (ref 11.6–15.1)
EST. AVERAGE GLUCOSE BLD GHB EST-MCNC: 235 MG/DL
GFR SERPL CREATININE-BSD FRML MDRD: 94 ML/MIN/1.73SQ M
GLUCOSE P FAST SERPL-MCNC: 234 MG/DL (ref 65–99)
GLUCOSE UR STRIP-MCNC: ABNORMAL MG/DL
HBA1C MFR BLD: 9.8 %
HCT VFR BLD AUTO: 38.7 % (ref 36.5–49.3)
HCV AB SER QL: ABNORMAL
HDLC SERPL-MCNC: 36 MG/DL
HGB BLD-MCNC: 12.3 G/DL (ref 12–17)
HGB UR QL STRIP.AUTO: NEGATIVE
HYALINE CASTS #/AREA URNS LPF: NORMAL /LPF
KETONES UR STRIP-MCNC: NEGATIVE MG/DL
LDLC SERPL CALC-MCNC: 45 MG/DL (ref 0–100)
LEUKOCYTE ESTERASE UR QL STRIP: ABNORMAL
MCH RBC QN AUTO: 31.2 PG (ref 26.8–34.3)
MCHC RBC AUTO-ENTMCNC: 31.8 G/DL (ref 31.4–37.4)
MCV RBC AUTO: 98 FL (ref 82–98)
MICROALBUMIN UR-MCNC: 12.9 MG/L (ref 0–20)
MICROALBUMIN/CREAT 24H UR: 12 MG/G CREATININE (ref 0–30)
NITRITE UR QL STRIP: NEGATIVE
NON-SQ EPI CELLS URNS QL MICRO: NORMAL /HPF
NONHDLC SERPL-MCNC: 81 MG/DL
PH UR STRIP.AUTO: 5.5 [PH]
PLATELET # BLD AUTO: 334 THOUSANDS/UL (ref 149–390)
PMV BLD AUTO: 11 FL (ref 8.9–12.7)
POTASSIUM SERPL-SCNC: 4.5 MMOL/L (ref 3.5–5.3)
PROT SERPL-MCNC: 6.6 G/DL (ref 6.4–8.2)
PROT UR STRIP-MCNC: NEGATIVE MG/DL
PSA SERPL-MCNC: 1.6 NG/ML (ref 0–4)
RBC # BLD AUTO: 3.94 MILLION/UL (ref 3.88–5.62)
RBC #/AREA URNS AUTO: NORMAL /HPF
SODIUM SERPL-SCNC: 138 MMOL/L (ref 136–145)
SP GR UR STRIP.AUTO: 1.02 (ref 1–1.03)
TRIGL SERPL-MCNC: 178 MG/DL
TSH SERPL DL<=0.05 MIU/L-ACNC: 1.54 UIU/ML (ref 0.36–3.74)
UROBILINOGEN UR QL STRIP.AUTO: 0.2 E.U./DL
VIT B12 SERPL-MCNC: 223 PG/ML (ref 100–900)
WBC # BLD AUTO: 8.81 THOUSAND/UL (ref 4.31–10.16)
WBC #/AREA URNS AUTO: NORMAL /HPF

## 2021-12-08 PROCEDURE — 81001 URINALYSIS AUTO W/SCOPE: CPT

## 2021-12-08 PROCEDURE — 82607 VITAMIN B-12: CPT

## 2021-12-08 PROCEDURE — 3046F HEMOGLOBIN A1C LEVEL >9.0%: CPT | Performed by: FAMILY MEDICINE

## 2021-12-08 PROCEDURE — 83036 HEMOGLOBIN GLYCOSYLATED A1C: CPT

## 2021-12-08 PROCEDURE — 84443 ASSAY THYROID STIM HORMONE: CPT

## 2021-12-08 PROCEDURE — 3061F NEG MICROALBUMINURIA REV: CPT | Performed by: FAMILY MEDICINE

## 2021-12-08 PROCEDURE — 80053 COMPREHEN METABOLIC PANEL: CPT

## 2021-12-08 PROCEDURE — 85027 COMPLETE CBC AUTOMATED: CPT

## 2021-12-08 PROCEDURE — 82570 ASSAY OF URINE CREATININE: CPT | Performed by: FAMILY MEDICINE

## 2021-12-08 PROCEDURE — 82306 VITAMIN D 25 HYDROXY: CPT

## 2021-12-08 PROCEDURE — G0103 PSA SCREENING: HCPCS

## 2021-12-08 PROCEDURE — 80061 LIPID PANEL: CPT

## 2021-12-08 PROCEDURE — 82043 UR ALBUMIN QUANTITATIVE: CPT | Performed by: FAMILY MEDICINE

## 2021-12-08 PROCEDURE — 36415 COLL VENOUS BLD VENIPUNCTURE: CPT

## 2021-12-08 PROCEDURE — 86803 HEPATITIS C AB TEST: CPT

## 2021-12-14 ENCOUNTER — OFFICE VISIT (OUTPATIENT)
Dept: FAMILY MEDICINE CLINIC | Facility: CLINIC | Age: 71
End: 2021-12-14
Payer: COMMERCIAL

## 2021-12-14 VITALS
OXYGEN SATURATION: 96 % | HEIGHT: 65 IN | SYSTOLIC BLOOD PRESSURE: 118 MMHG | BODY MASS INDEX: 21.83 KG/M2 | HEART RATE: 74 BPM | WEIGHT: 131 LBS | DIASTOLIC BLOOD PRESSURE: 62 MMHG | RESPIRATION RATE: 16 BRPM

## 2021-12-14 DIAGNOSIS — I10 ESSENTIAL HYPERTENSION: ICD-10-CM

## 2021-12-14 DIAGNOSIS — I51.81 TAKOTSUBO CARDIOMYOPATHY: ICD-10-CM

## 2021-12-14 DIAGNOSIS — R76.8 HEPATITIS C ANTIBODY TEST POSITIVE: Primary | ICD-10-CM

## 2021-12-14 DIAGNOSIS — I70.8 ATHEROSCLEROSIS OF ILIAC ARTERY: ICD-10-CM

## 2021-12-14 DIAGNOSIS — E10.9 TYPE 1 DIABETES MELLITUS WITHOUT COMPLICATION (HCC): ICD-10-CM

## 2021-12-14 PROCEDURE — 99214 OFFICE O/P EST MOD 30 MIN: CPT | Performed by: FAMILY MEDICINE

## 2021-12-14 PROCEDURE — 1160F RVW MEDS BY RX/DR IN RCRD: CPT | Performed by: FAMILY MEDICINE

## 2021-12-14 PROCEDURE — 3078F DIAST BP <80 MM HG: CPT | Performed by: FAMILY MEDICINE

## 2021-12-14 PROCEDURE — 3074F SYST BP LT 130 MM HG: CPT | Performed by: FAMILY MEDICINE

## 2021-12-14 PROCEDURE — 3008F BODY MASS INDEX DOCD: CPT | Performed by: FAMILY MEDICINE

## 2021-12-14 PROCEDURE — 3725F SCREEN DEPRESSION PERFORMED: CPT | Performed by: FAMILY MEDICINE

## 2021-12-14 PROCEDURE — 1036F TOBACCO NON-USER: CPT | Performed by: FAMILY MEDICINE

## 2022-01-05 DIAGNOSIS — E11.65 TYPE 2 DIABETES MELLITUS WITH HYPERGLYCEMIA, WITH LONG-TERM CURRENT USE OF INSULIN (HCC): ICD-10-CM

## 2022-01-05 DIAGNOSIS — Z79.4 TYPE 2 DIABETES MELLITUS WITH HYPERGLYCEMIA, WITH LONG-TERM CURRENT USE OF INSULIN (HCC): ICD-10-CM

## 2022-01-05 RX ORDER — METFORMIN HYDROCHLORIDE 500 MG/1
TABLET, EXTENDED RELEASE ORAL
Qty: 180 TABLET | Refills: 0 | Status: SHIPPED | OUTPATIENT
Start: 2022-01-05 | End: 2022-02-08

## 2022-01-26 DIAGNOSIS — Z79.4 TYPE 2 DIABETES MELLITUS WITH HYPERGLYCEMIA, WITH LONG-TERM CURRENT USE OF INSULIN (HCC): ICD-10-CM

## 2022-01-26 DIAGNOSIS — E11.65 TYPE 2 DIABETES MELLITUS WITH HYPERGLYCEMIA, WITH LONG-TERM CURRENT USE OF INSULIN (HCC): ICD-10-CM

## 2022-01-27 PROCEDURE — 4010F ACE/ARB THERAPY RXD/TAKEN: CPT | Performed by: FAMILY MEDICINE

## 2022-01-27 RX ORDER — LISINOPRIL 20 MG/1
TABLET ORAL
Qty: 90 TABLET | Refills: 0 | Status: SHIPPED | OUTPATIENT
Start: 2022-01-27 | End: 2022-03-31

## 2022-01-27 RX ORDER — PRAVASTATIN SODIUM 10 MG
TABLET ORAL
Qty: 90 TABLET | Refills: 0 | Status: SHIPPED | OUTPATIENT
Start: 2022-01-27 | End: 2022-03-31

## 2022-02-08 DIAGNOSIS — Z79.4 TYPE 2 DIABETES MELLITUS WITH HYPERGLYCEMIA, WITH LONG-TERM CURRENT USE OF INSULIN (HCC): ICD-10-CM

## 2022-02-08 DIAGNOSIS — E11.65 TYPE 2 DIABETES MELLITUS WITH HYPERGLYCEMIA, WITH LONG-TERM CURRENT USE OF INSULIN (HCC): ICD-10-CM

## 2022-02-08 RX ORDER — METFORMIN HYDROCHLORIDE 500 MG/1
TABLET, EXTENDED RELEASE ORAL
Qty: 180 TABLET | Refills: 0 | Status: SHIPPED | OUTPATIENT
Start: 2022-02-08 | End: 2022-04-25

## 2022-02-16 DIAGNOSIS — Z79.4 TYPE 2 DIABETES MELLITUS WITH HYPERGLYCEMIA, WITH LONG-TERM CURRENT USE OF INSULIN (HCC): ICD-10-CM

## 2022-02-16 DIAGNOSIS — E11.65 TYPE 2 DIABETES MELLITUS WITH HYPERGLYCEMIA, WITH LONG-TERM CURRENT USE OF INSULIN (HCC): ICD-10-CM

## 2022-02-16 RX ORDER — ISOPROPYL ALCOHOL 0.75 G/1
SWAB TOPICAL
Qty: 400 EACH | Refills: 1 | Status: SHIPPED | OUTPATIENT
Start: 2022-02-16 | End: 2022-07-11

## 2022-03-23 DIAGNOSIS — Z79.4 TYPE 2 DIABETES MELLITUS WITH HYPERGLYCEMIA, WITH LONG-TERM CURRENT USE OF INSULIN (HCC): ICD-10-CM

## 2022-03-23 DIAGNOSIS — E11.65 TYPE 2 DIABETES MELLITUS WITH HYPERGLYCEMIA, WITH LONG-TERM CURRENT USE OF INSULIN (HCC): ICD-10-CM

## 2022-03-23 RX ORDER — PEN NEEDLE, DIABETIC 31 GX5/16"
NEEDLE, DISPOSABLE MISCELLANEOUS
Qty: 400 EACH | Refills: 1 | Status: SHIPPED | OUTPATIENT
Start: 2022-03-23

## 2022-03-31 DIAGNOSIS — E11.65 TYPE 2 DIABETES MELLITUS WITH HYPERGLYCEMIA, WITH LONG-TERM CURRENT USE OF INSULIN (HCC): ICD-10-CM

## 2022-03-31 DIAGNOSIS — Z79.4 TYPE 2 DIABETES MELLITUS WITH HYPERGLYCEMIA, WITH LONG-TERM CURRENT USE OF INSULIN (HCC): ICD-10-CM

## 2022-03-31 RX ORDER — LISINOPRIL 20 MG/1
TABLET ORAL
Qty: 90 TABLET | Refills: 0 | Status: SHIPPED | OUTPATIENT
Start: 2022-03-31 | End: 2022-07-06

## 2022-03-31 RX ORDER — PRAVASTATIN SODIUM 10 MG
TABLET ORAL
Qty: 90 TABLET | Refills: 0 | Status: SHIPPED | OUTPATIENT
Start: 2022-03-31 | End: 2022-07-06

## 2022-04-12 ENCOUNTER — OFFICE VISIT (OUTPATIENT)
Dept: FAMILY MEDICINE CLINIC | Facility: CLINIC | Age: 72
End: 2022-04-12
Payer: COMMERCIAL

## 2022-04-12 VITALS
SYSTOLIC BLOOD PRESSURE: 116 MMHG | RESPIRATION RATE: 16 BRPM | DIASTOLIC BLOOD PRESSURE: 78 MMHG | OXYGEN SATURATION: 96 % | HEIGHT: 65 IN | BODY MASS INDEX: 21.99 KG/M2 | WEIGHT: 132 LBS | HEART RATE: 55 BPM

## 2022-04-12 DIAGNOSIS — I51.81 TAKOTSUBO CARDIOMYOPATHY: ICD-10-CM

## 2022-04-12 DIAGNOSIS — E11.65 TYPE 2 DIABETES MELLITUS WITH HYPERGLYCEMIA, WITH LONG-TERM CURRENT USE OF INSULIN (HCC): Primary | ICD-10-CM

## 2022-04-12 DIAGNOSIS — Z12.11 SCREENING FOR MALIGNANT NEOPLASM OF COLON: ICD-10-CM

## 2022-04-12 DIAGNOSIS — I10 ESSENTIAL HYPERTENSION: ICD-10-CM

## 2022-04-12 DIAGNOSIS — Z79.4 TYPE 2 DIABETES MELLITUS WITH HYPERGLYCEMIA, WITH LONG-TERM CURRENT USE OF INSULIN (HCC): Primary | ICD-10-CM

## 2022-04-12 PROCEDURE — 3008F BODY MASS INDEX DOCD: CPT | Performed by: FAMILY MEDICINE

## 2022-04-12 PROCEDURE — 1160F RVW MEDS BY RX/DR IN RCRD: CPT | Performed by: FAMILY MEDICINE

## 2022-04-12 PROCEDURE — 99214 OFFICE O/P EST MOD 30 MIN: CPT | Performed by: FAMILY MEDICINE

## 2022-04-12 PROCEDURE — 3078F DIAST BP <80 MM HG: CPT | Performed by: FAMILY MEDICINE

## 2022-04-12 PROCEDURE — 3074F SYST BP LT 130 MM HG: CPT | Performed by: FAMILY MEDICINE

## 2022-04-12 PROCEDURE — 1036F TOBACCO NON-USER: CPT | Performed by: FAMILY MEDICINE

## 2022-04-12 NOTE — PATIENT INSTRUCTIONS
10% - bad control"> 10% - bad control,Hemoglobin A1c (HbA1c) greater than 10% indicating poor diabetic control,Haemoglobin A1c greater than 10% indicating poor diabetic control">   Diabetes Mellitus Type 2 in Adults, Ambulatory Care   GENERAL INFORMATION:   Diabetes mellitus type 2  is a disease that affects how your body uses glucose (sugar)  Insulin helps move sugar out of the blood so it can be used for energy  Normally, when the blood sugar level increases, the pancreas makes more insulin  Type 2 diabetes develops because either the body cannot make enough insulin, or it cannot use the insulin correctly  After many years, your pancreas may stop making insulin  Common symptoms include the following:   · More hunger or thirst than usual     · Frequent urination     · Weight loss without trying     · Blurred vision  Seek immediate care for the following symptoms:   · Severe abdominal pain, or pain that spreads to your back  You may also be vomiting  · Trouble staying awake or focusing    · Shaking or sweating    · Blurred or double vision    · Breath has a fruity, sweet smell    · Breathing is deep and labored, or rapid and shallow    · Heartbeat is fast and weak  Treatment for diabetes mellitus type 2  includes keeping your blood sugar at a normal level  You must eat the right foods, and exercise regularly  You may also need medicine if you cannot control your blood sugar level with nutrition and exercise  Manage diabetes mellitus type 2:   · Check your blood sugar level  You will be taught how to check a small drop of blood in a glucose monitor  Ask your healthcare provider when and how often to check during the day  Ask your healthcare provider what your blood sugar levels should be when you check them  · Keep track of carbohydrates (sugar and starchy foods)  Your blood sugar level can get too high if you eat too many carbohydrates   Your dietitian will help you plan meals and snacks that have the right amount of carbohydrates  · Eat low-fat foods  Some examples are skinless chicken and low-fat milk  · Eat less sodium (salt)  Some examples of high-sodium foods to limit are soy sauce, potato chips, and soup  Do not add salt to food you cook  Limit your use of table salt  · Eat high-fiber foods  Foods that are a good source of fiber include vegetables, whole grain bread, and beans  · Limit alcohol  Alcohol affects your blood sugar level and can make it harder to manage your diabetes  Women should limit alcohol to 1 drink a day  Men should limit alcohol to 2 drinks a day  A drink of alcohol is 12 ounces of beer, 5 ounces of wine, or 1½ ounces of liquor  · Get regular exercise  Exercise can help keep your blood sugar level steady, decrease your risk of heart disease, and help you lose weight  Exercise for at least 30 minutes, 5 days a week  Include muscle strengthening activities 2 days each week  Work with your healthcare provider to create an exercise plan  · Check your feet each day  for injuries or open sores  Ask your healthcare provider for activities you can do if you have an open sore  · Quit smoking  If you smoke, it is never too late to quit  Smoking can worsen the problems that may occur with diabetes  Ask your healthcare provider for information about how to stop smoking if you are having trouble quitting  · Ask about your weight:  Ask healthcare providers if you need to lose weight, and how much to lose  Ask them to help you with a weight loss program  Even a 10 to 15 pound weight loss can help you manage your blood sugar level  · Carry medical alert identification  Wear medical alert jewelry or carry a card that says you have diabetes  Ask your healthcare provider where to get these items  · Ask about vaccines  Diabetes puts you at risk of serious illness if you get the flu, pneumonia, or hepatitis   Ask your healthcare provider if you should get a flu, pneumonia, or hepatitis B vaccine, and when to get the vaccine  Follow up with your healthcare provider as directed:  Write down your questions so you remember to ask them during your visits  CARE AGREEMENT:   You have the right to help plan your care  Learn about your health condition and how it may be treated  Discuss treatment options with your caregivers to decide what care you want to receive  You always have the right to refuse treatment  The above information is an  only  It is not intended as medical advice for individual conditions or treatments  Talk to your doctor, nurse or pharmacist before following any medical regimen to see if it is safe and effective for you  © 2014 [address] is for End User's use only and may not be sold, redistributed or otherwise used for commercial purposes  All illustrations and images included in CareNotes® are the copyrighted property of A D A M , Inc  or [employer]  10% - bad control"> 10% - bad control,Hemoglobin A1c (HbA1c) greater than 10% indicating poor diabetic control,Haemoglobin A1c greater than 10% indicating poor diabetic control">   Diabetes Mellitus Type 2 in Adults, Ambulatory Care   GENERAL INFORMATION:   Diabetes mellitus type 2  is a disease that affects how your body uses glucose (sugar)  Insulin helps move sugar out of the blood so it can be used for energy  Normally, when the blood sugar level increases, the pancreas makes more insulin  Type 2 diabetes develops because either the body cannot make enough insulin, or it cannot use the insulin correctly  After many years, your pancreas may stop making insulin  Common symptoms include the following:   · More hunger or thirst than usual     · Frequent urination     · Weight loss without trying     · Blurred vision  Seek immediate care for the following symptoms:   · Severe abdominal pain, or pain that spreads to your back   You may also be vomiting  · Trouble staying awake or focusing    · Shaking or sweating    · Blurred or double vision    · Breath has a fruity, sweet smell    · Breathing is deep and labored, or rapid and shallow    · Heartbeat is fast and weak  Treatment for diabetes mellitus type 2  includes keeping your blood sugar at a normal level  You must eat the right foods, and exercise regularly  You may also need medicine if you cannot control your blood sugar level with nutrition and exercise  Manage diabetes mellitus type 2:   · Check your blood sugar level  You will be taught how to check a small drop of blood in a glucose monitor  Ask your healthcare provider when and how often to check during the day  Ask your healthcare provider what your blood sugar levels should be when you check them  · Keep track of carbohydrates (sugar and starchy foods)  Your blood sugar level can get too high if you eat too many carbohydrates  Your dietitian will help you plan meals and snacks that have the right amount of carbohydrates  · Eat low-fat foods  Some examples are skinless chicken and low-fat milk  · Eat less sodium (salt)  Some examples of high-sodium foods to limit are soy sauce, potato chips, and soup  Do not add salt to food you cook  Limit your use of table salt  · Eat high-fiber foods  Foods that are a good source of fiber include vegetables, whole grain bread, and beans  · Limit alcohol  Alcohol affects your blood sugar level and can make it harder to manage your diabetes  Women should limit alcohol to 1 drink a day  Men should limit alcohol to 2 drinks a day  A drink of alcohol is 12 ounces of beer, 5 ounces of wine, or 1½ ounces of liquor  · Get regular exercise  Exercise can help keep your blood sugar level steady, decrease your risk of heart disease, and help you lose weight  Exercise for at least 30 minutes, 5 days a week  Include muscle strengthening activities 2 days each week   Work with your healthcare provider to create an exercise plan  · Check your feet each day  for injuries or open sores  Ask your healthcare provider for activities you can do if you have an open sore  · Quit smoking  If you smoke, it is never too late to quit  Smoking can worsen the problems that may occur with diabetes  Ask your healthcare provider for information about how to stop smoking if you are having trouble quitting  · Ask about your weight:  Ask healthcare providers if you need to lose weight, and how much to lose  Ask them to help you with a weight loss program  Even a 10 to 15 pound weight loss can help you manage your blood sugar level  · Carry medical alert identification  Wear medical alert jewelry or carry a card that says you have diabetes  Ask your healthcare provider where to get these items  · Ask about vaccines  Diabetes puts you at risk of serious illness if you get the flu, pneumonia, or hepatitis  Ask your healthcare provider if you should get a flu, pneumonia, or hepatitis B vaccine, and when to get the vaccine  Follow up with your healthcare provider as directed:  Write down your questions so you remember to ask them during your visits  CARE AGREEMENT:   You have the right to help plan your care  Learn about your health condition and how it may be treated  Discuss treatment options with your caregivers to decide what care you want to receive  You always have the right to refuse treatment  The above information is an  only  It is not intended as medical advice for individual conditions or treatments  Talk to your doctor, nurse or pharmacist before following any medical regimen to see if it is safe and effective for you  © 2014 [address] is for End User's use only and may not be sold, redistributed or otherwise used for commercial purposes   All illustrations and images included in CareNotes® are the copyrighted property of [employer]  or Shiv Moscoso

## 2022-04-12 NOTE — PROGRESS NOTES
Assessment/Plan:    Take shot with snacks too   Get the colon cancer screening done   Cont with vit d   Do the labs previously ordered    1  Type 2 diabetes mellitus with hyperglycemia, with long-term current use of insulin (HCC)  -     Microalbumin / creatinine urine ratio    2  Screening for malignant neoplasm of colon  -     Cologuard    3  Takotsubo cardiomyopathy    4  Essential hypertension       Subjective:      Patient ID: Paolo Zaldivar is a 73 y o  male  HPI   Here to go over chronic issues and labs / imaging studies if applicable  IDDM   Vit Ddef   Hep C screen low reactive     25 units long acting   And aprox 15u tid with meals     The following portions of the patient's history were reviewed and updated as appropriate: allergies, current medications, past family history, past medical history, past social history, past surgical history and problem list     Review of Systems   Constitutional: Negative for activity change, appetite change and fever  HENT: Negative for congestion, nosebleeds and trouble swallowing  Eyes: Negative for itching  Respiratory: Negative for cough and chest tightness  Cardiovascular: Negative for chest pain and palpitations  Gastrointestinal: Negative for abdominal pain, constipation, diarrhea and nausea  Endocrine: Negative for cold intolerance  Genitourinary: Negative for frequency  Musculoskeletal: Negative for gait problem and joint swelling  Skin: Negative for rash  Allergic/Immunologic: Negative for immunocompromised state  Neurological: Negative for dizziness, tremors, seizures, syncope and headaches  Psychiatric/Behavioral: Negative for hallucinations and suicidal ideas  Objective:      /78 (BP Location: Left arm, Patient Position: Sitting, Cuff Size: Standard)   Pulse 55   Resp 16   Ht 5' 5" (1 651 m)   Wt 59 9 kg (132 lb)   SpO2 96%   BMI 21 97 kg/m²     No visits with results within 2 Week(s) from this visit     Latest known visit with results is:   Appointment on 12/08/2021   Component Date Value    Hepatitis C Ab 12/08/2021 Low Reactive*    TSH 3RD GENERATON 12/08/2021 1 540     Vit D, 25-Hydroxy 12/08/2021 13 2*    Cholesterol 12/08/2021 117     Triglycerides 12/08/2021 178*    HDL, Direct 12/08/2021 36*    LDL Calculated 12/08/2021 45     Non-HDL-Chol (CHOL-HDL) 12/08/2021 81     Sodium 12/08/2021 138     Potassium 12/08/2021 4 5     Chloride 12/08/2021 105     CO2 12/08/2021 28     ANION GAP 12/08/2021 5     BUN 12/08/2021 14     Creatinine 12/08/2021 0 76     Glucose, Fasting 12/08/2021 234*    Calcium 12/08/2021 8 7     AST 12/08/2021 20     ALT 12/08/2021 47     Alkaline Phosphatase 12/08/2021 31*    Total Protein 12/08/2021 6 6     Albumin 12/08/2021 3 5     Total Bilirubin 12/08/2021 0 42     eGFR 12/08/2021 94     WBC 12/08/2021 8 81     RBC 12/08/2021 3 94     Hemoglobin 12/08/2021 12 3     Hematocrit 12/08/2021 38 7     MCV 12/08/2021 98     MCH 12/08/2021 31 2     MCHC 12/08/2021 31 8     RDW 12/08/2021 14 0     Platelets 92/67/7515 334     MPV 12/08/2021 11 0     Vitamin B-12 12/08/2021 223     Color, UA 12/08/2021 Yellow     Clarity, UA 12/08/2021 Clear     Specific Gravity, UA 12/08/2021 1 021     pH, UA 12/08/2021 5 5     Leukocytes, UA 12/08/2021 Elevated glucose may cause decreased leukocyte values   See urine microscopic for [facility] result/*    Nitrite, UA 12/08/2021 Negative     Protein, UA 12/08/2021 Negative     Glucose, UA 12/08/2021 >=1000 (1%)*    Ketones, UA 12/08/2021 Negative     Urobilinogen, UA 12/08/2021 0 2     Bilirubin, UA 12/08/2021 Negative     Blood, UA 12/08/2021 Negative     Hemoglobin A1C 12/08/2021 9 8*    EAG 12/08/2021 235     PSA 12/08/2021 1 6     RBC, UA 12/08/2021 None Seen     WBC, UA 12/08/2021 None Seen     Epithelial Cells 12/08/2021 None Seen     Bacteria, UA 12/08/2021 None Seen     Hyaline Casts, UA 12/08/2021 None Seen Physical Exam  Vitals and nursing note reviewed  Constitutional:       General: He is not in acute distress  Appearance: He is well-developed  HENT:      Head: Normocephalic and atraumatic  Cardiovascular:      Rate and Rhythm: Normal rate and regular rhythm  Heart sounds: Normal heart sounds  No murmur heard  Pulmonary:      Effort: Pulmonary effort is normal       Breath sounds: Normal breath sounds  No wheezing or rales  Abdominal:      General: Bowel sounds are normal  There is no distension  Palpations: Abdomen is soft  Tenderness: There is no abdominal tenderness  There is no guarding or rebound  Musculoskeletal:         General: No tenderness  Normal range of motion  Cervical back: Normal range of motion and neck supple  Lymphadenopathy:      Cervical: No cervical adenopathy  Skin:     General: Skin is warm and dry  Capillary Refill: Capillary refill takes less than 2 seconds  Findings: No rash  Neurological:      Mental Status: He is alert and oriented to person, place, and time  Cranial Nerves: No cranial nerve deficit  Sensory: No sensory deficit  Motor: No abnormal muscle tone  Psychiatric:         Behavior: Behavior normal          Thought Content:  Thought content normal          Judgment: Judgment normal              Dulce Hutson MD  [place] [id number]

## 2022-04-23 DIAGNOSIS — E11.65 TYPE 2 DIABETES MELLITUS WITH HYPERGLYCEMIA, WITH LONG-TERM CURRENT USE OF INSULIN (HCC): ICD-10-CM

## 2022-04-23 DIAGNOSIS — Z79.4 TYPE 2 DIABETES MELLITUS WITH HYPERGLYCEMIA, WITH LONG-TERM CURRENT USE OF INSULIN (HCC): ICD-10-CM

## 2022-04-25 RX ORDER — METFORMIN HYDROCHLORIDE 500 MG/1
TABLET, EXTENDED RELEASE ORAL
Qty: 180 TABLET | Refills: 0 | Status: SHIPPED | OUTPATIENT
Start: 2022-04-25 | End: 2022-07-05

## 2022-05-02 DIAGNOSIS — E10.9 TYPE 1 DIABETES MELLITUS WITHOUT COMPLICATION (HCC): ICD-10-CM

## 2022-05-02 RX ORDER — INSULIN ASPART 100 [IU]/ML
INJECTION, SOLUTION INTRAVENOUS; SUBCUTANEOUS
Qty: 45 ML | Refills: 1 | Status: SHIPPED | OUTPATIENT
Start: 2022-05-02

## 2022-06-29 DIAGNOSIS — E10.9 TYPE 1 DIABETES MELLITUS WITHOUT COMPLICATION (HCC): ICD-10-CM

## 2022-06-30 RX ORDER — INSULIN GLARGINE 300 U/ML
INJECTION, SOLUTION SUBCUTANEOUS
Qty: 9 ML | Refills: 11 | Status: SHIPPED | OUTPATIENT
Start: 2022-06-30

## 2022-07-04 DIAGNOSIS — E11.65 TYPE 2 DIABETES MELLITUS WITH HYPERGLYCEMIA, WITH LONG-TERM CURRENT USE OF INSULIN (HCC): ICD-10-CM

## 2022-07-04 DIAGNOSIS — Z79.4 TYPE 2 DIABETES MELLITUS WITH HYPERGLYCEMIA, WITH LONG-TERM CURRENT USE OF INSULIN (HCC): ICD-10-CM

## 2022-07-05 RX ORDER — METFORMIN HYDROCHLORIDE 500 MG/1
TABLET, EXTENDED RELEASE ORAL
Qty: 180 TABLET | Refills: 0 | Status: SHIPPED | OUTPATIENT
Start: 2022-07-05 | End: 2022-09-07

## 2022-07-06 DIAGNOSIS — E11.65 TYPE 2 DIABETES MELLITUS WITH HYPERGLYCEMIA, WITH LONG-TERM CURRENT USE OF INSULIN (HCC): ICD-10-CM

## 2022-07-06 DIAGNOSIS — Z79.4 TYPE 2 DIABETES MELLITUS WITH HYPERGLYCEMIA, WITH LONG-TERM CURRENT USE OF INSULIN (HCC): ICD-10-CM

## 2022-07-06 RX ORDER — LISINOPRIL 20 MG/1
TABLET ORAL
Qty: 90 TABLET | Refills: 0 | Status: SHIPPED | OUTPATIENT
Start: 2022-07-06

## 2022-07-06 RX ORDER — PRAVASTATIN SODIUM 10 MG
TABLET ORAL
Qty: 90 TABLET | Refills: 0 | Status: SHIPPED | OUTPATIENT
Start: 2022-07-06

## 2022-07-11 DIAGNOSIS — Z79.4 TYPE 2 DIABETES MELLITUS WITH HYPERGLYCEMIA, WITH LONG-TERM CURRENT USE OF INSULIN (HCC): ICD-10-CM

## 2022-07-11 DIAGNOSIS — E11.65 TYPE 2 DIABETES MELLITUS WITH HYPERGLYCEMIA, WITH LONG-TERM CURRENT USE OF INSULIN (HCC): ICD-10-CM

## 2022-07-11 RX ORDER — ISOPROPYL ALCOHOL 0.75 G/1
SWAB TOPICAL
Qty: 400 EACH | Refills: 1 | Status: SHIPPED | OUTPATIENT
Start: 2022-07-11

## 2022-08-24 DIAGNOSIS — Z79.4 TYPE 2 DIABETES MELLITUS WITH HYPERGLYCEMIA, WITH LONG-TERM CURRENT USE OF INSULIN (HCC): ICD-10-CM

## 2022-08-24 DIAGNOSIS — E11.65 TYPE 2 DIABETES MELLITUS WITH HYPERGLYCEMIA, WITH LONG-TERM CURRENT USE OF INSULIN (HCC): ICD-10-CM

## 2022-08-24 RX ORDER — PEN NEEDLE, DIABETIC 31 GX5/16"
NEEDLE, DISPOSABLE MISCELLANEOUS
Qty: 400 EACH | Refills: 1 | Status: SHIPPED | OUTPATIENT
Start: 2022-08-24

## 2022-09-06 DIAGNOSIS — E11.65 TYPE 2 DIABETES MELLITUS WITH HYPERGLYCEMIA, WITH LONG-TERM CURRENT USE OF INSULIN (HCC): ICD-10-CM

## 2022-09-06 DIAGNOSIS — Z79.4 TYPE 2 DIABETES MELLITUS WITH HYPERGLYCEMIA, WITH LONG-TERM CURRENT USE OF INSULIN (HCC): ICD-10-CM

## 2022-09-07 RX ORDER — METFORMIN HYDROCHLORIDE 500 MG/1
TABLET, EXTENDED RELEASE ORAL
Qty: 360 TABLET | Refills: 0 | Status: SHIPPED | OUTPATIENT
Start: 2022-09-07

## 2022-09-21 DIAGNOSIS — I10 ESSENTIAL HYPERTENSION: ICD-10-CM

## 2022-10-28 ENCOUNTER — RA CDI HCC (OUTPATIENT)
Dept: OTHER | Facility: HOSPITAL | Age: 72
End: 2022-10-28

## 2022-10-28 NOTE — PROGRESS NOTES
[facility]  coding opportunities       Chart reviewed, no opportunity found:   [address]        Patients Insurance     [place] [facility] [place]

## 2022-11-02 DIAGNOSIS — E10.9 TYPE 1 DIABETES MELLITUS WITHOUT COMPLICATION (HCC): ICD-10-CM

## 2022-11-02 RX ORDER — BLOOD SUGAR DIAGNOSTIC
STRIP MISCELLANEOUS
Qty: 350 STRIP | Refills: 5 | Status: SHIPPED | OUTPATIENT
Start: 2022-11-02

## 2022-11-04 ENCOUNTER — OFFICE VISIT (OUTPATIENT)
Dept: FAMILY MEDICINE CLINIC | Facility: CLINIC | Age: 72
End: 2022-11-04

## 2022-11-04 VITALS
HEART RATE: 57 BPM | WEIGHT: 127 LBS | BODY MASS INDEX: 21.16 KG/M2 | SYSTOLIC BLOOD PRESSURE: 120 MMHG | OXYGEN SATURATION: 98 % | HEIGHT: 65 IN | RESPIRATION RATE: 16 BRPM | DIASTOLIC BLOOD PRESSURE: 80 MMHG

## 2022-11-04 DIAGNOSIS — E55.9 VITAMIN D DEFICIENCY: ICD-10-CM

## 2022-11-04 DIAGNOSIS — Z00.00 MEDICARE ANNUAL WELLNESS VISIT, SUBSEQUENT: Primary | ICD-10-CM

## 2022-11-04 DIAGNOSIS — Z12.5 SCREENING FOR PROSTATE CANCER: ICD-10-CM

## 2022-11-04 DIAGNOSIS — Z00.00 WELL ADULT EXAM: ICD-10-CM

## 2022-11-04 DIAGNOSIS — E10.9 TYPE 1 DIABETES MELLITUS WITHOUT COMPLICATION (HCC): ICD-10-CM

## 2022-11-04 DIAGNOSIS — N40.1 BENIGN PROSTATIC HYPERPLASIA WITH NOCTURIA: ICD-10-CM

## 2022-11-04 DIAGNOSIS — R76.8 HEPATITIS C ANTIBODY TEST POSITIVE: ICD-10-CM

## 2022-11-04 DIAGNOSIS — I10 ESSENTIAL HYPERTENSION: ICD-10-CM

## 2022-11-04 DIAGNOSIS — R35.1 BENIGN PROSTATIC HYPERPLASIA WITH NOCTURIA: ICD-10-CM

## 2022-11-04 DIAGNOSIS — Z23 NEED FOR VACCINATION: ICD-10-CM

## 2022-11-04 LAB
DME PARACHUTE DELIVERY DATE REQUESTED: NORMAL
DME PARACHUTE ITEM DESCRIPTION: NORMAL
DME PARACHUTE ITEM DESCRIPTION: NORMAL
DME PARACHUTE ORDER STATUS: NORMAL
DME PARACHUTE SUPPLIER NAME: NORMAL
DME PARACHUTE SUPPLIER PHONE: NORMAL

## 2022-11-04 NOTE — ASSESSMENT & PLAN NOTE
Lab Results   Component Value Date    HGBA1C 9 8 (H) 12/08/2021   Were going to send Kwame You for blood work again to redo the hemoglobin A1c he is on basal insulin and 3 times a day NovoLog with a wide variability of blood glucose levels with some as low as 50 or lower he does have some morning sugars that are quite low along with some that have reached as high as 300  He has had issues in the past with hypoglycemia syncope  A Daniel monitor will be very helpful as we try to control his wide variability and blood sugar levels  He currently tests 4 times a day although testing more frequently will be able to help us get better control

## 2022-11-04 NOTE — PROGRESS NOTES
Assessment and Plan:     Checking a1c again   Cont with metformin  Take imodium once daily   If psa is high then uro   If not then flomax       Problem List Items Addressed This Visit        Endocrine    Type 1 diabetes mellitus without complication ([address] )       Lab Results   Component Value Date    HGBA1C 9 8 (H) 12/08/2021   Were going to send Lee Herring for blood work again to redo the hemoglobin A1c he is on basal insulin and 3 times a day NovoLog with a wide variability of blood glucose levels with some as low as 50 or lower he does have some morning sugars that are quite low along with some that have reached as high as 300  He has had issues in the past with hypoglycemia syncope  A Daniel monitor will be very helpful as we try to control his wide variability and blood sugar levels  He currently tests 4 times a day although testing more frequently will be able to help us get better control           Relevant Orders    HEMOGLOBIN A1C W/ EAG ESTIMATION    Microalbumin / creatinine urine ratio    Lipid Panel with Direct LDL reflex    CBC and differential    Comprehensive metabolic panel       Cardiovascular and Mediastinum    Essential hypertension       Other    Benign prostatic hyperplasia with nocturia      Other Visit Diagnoses     Medicare annual wellness visit, subsequent    -  Primary    Vitamin D deficiency        Relevant Orders    Vitamin D 25 hydroxy    Well adult exam        Need for vaccination        Relevant Orders    influenza vaccine, high-dose, PF 0 7 mL (FLUZONE HIGH-DOSE) (Completed)    Screening for prostate cancer        Relevant Orders    PSA, Total Screen    Hepatitis C antibody test positive        Relevant Orders    Hepatitis C RNA, quantitative, PCR        toujeo - 22 average   20 -26   novolog - 3 times 18 this am   Metformin 1gm bid  prava 10  Lisinopril 20 and met tart 25 bid   Asa 81mg         Diarrhea     Depression Screening and Follow-up Plan: Patient was screened for depression during today's encounter  They screened negative with a PHQ-2 score of 0  Preventive health issues were discussed with patient, and age appropriate screening tests were ordered as noted in patient's After Visit Summary  Personalized health advice and appropriate referrals for health education or preventive services given if needed, as noted in patient's After Visit Summary  History of Present Illness:     Patient presents for a Medicare Wellness Visit    HPI   Patient Care Team:  Lily Jarrett MD as PCP - General     Review of Systems:     Review of Systems   Constitutional: Negative for activity change, appetite change and fever  HENT: Negative for congestion, nosebleeds and trouble swallowing  Eyes: Negative for itching  Respiratory: Negative for cough and chest tightness  Cardiovascular: Negative for chest pain and palpitations  Gastrointestinal: Negative for abdominal pain, constipation, diarrhea and nausea  Endocrine: Negative for cold intolerance  Genitourinary: Negative for frequency  Nocturia   Musculoskeletal: Negative for gait problem and joint swelling  Skin: Negative for rash  Allergic/Immunologic: Negative for immunocompromised state  Neurological: Negative for dizziness, tremors, seizures, syncope and headaches  Psychiatric/Behavioral: Negative for hallucinations and suicidal ideas          Problem List:     Patient Active Problem List   Diagnosis   • Takotsubo cardiomyopathy   • Essential hypertension   • History of cardiac cath   • Atherosclerosis of iliac artery   • Type 1 diabetes mellitus without complication (HCC)   • Benign prostatic hyperplasia with nocturia      Past Medical and Surgical History:     Past Medical History:   Diagnosis Date   • Anxiety    • Arthritis    • Depression    • Diabetes mellitus ([facility] )    • Heart attack ([facility] )    • Heart murmur    • Hepatitis    • Hyperlipidemia    • Palpitations    • Peripheral vascular disease ([facility] )    • Stroke [facility])      Past Surgical History:   Procedure Laterality Date   • HERNIA REPAIR     • KNEE SURGERY Right 1977    Tendon      Family History:     Family History   Problem Relation Age of Onset   • Cancer Father         lymphoma, brain, lung & liver   • Mental illness Father    • Depression Father    • Depression Sister    • Depression Brother    • Stroke Maternal Grandfather    • Stroke Paternal Grandmother    • Diabetes Paternal Grandmother    • Heart attack Paternal Grandmother    • Depression Brother       Social History:     Social History     Socioeconomic History   • Marital status: /Civil Union     Spouse name: None   • Number of children: None   • Years of education: None   • Highest education level: None   Occupational History   • Occupation: Retired    Tobacco Use   • Smoking status: Former Smoker     Packs/day: 1 00     Years: 50 00     Pack years: 50 00     Types: Cigarettes   • Smokeless tobacco: Never Used   • Tobacco comment: 1 PPW   Substance and Sexual Activity   • Alcohol use: Yes   • Drug use: None     Comment: Denied   • Sexual activity: None   Other Topics Concern   • None   Social History Narrative    Most recent tobacco use screenin2020    Do you currently or have you served in the [employer]: No    Were you activated, into active duty, as a member of the [employer] or as a Reservist: No    Marital status: Single    Alcohol intake: Occasional    Live alone or with others: alone    High cholesterol: Yes    High blood pressure: Yes    Exercise level: None    low level    Chewing tobacco: none    Overweight: No    Obese: No    Diabetes: Yes    General stress level: Low    Diet: Diabetic    Occupation: Retired    Advance directive: No    Caffeine intake:  Moderate    1-2 cups of coffee, No soda, occasional Ice tea    Guns present in home: No    Seat belts used routinely: No    Sunscreen used routinely: No    Seat belt/car seat used routinely: Yes    Do you have regular medical check ups: Yes    Do you have regular dental check ups: Yes    Education: 12    Illicit drugs: Denied    Smoke alarm in home: Yes    Salt Intake: Normal Use    Has the Patient had a mammogram to screen for breast cancer within 24 months: No    Would the patient like to schedule a Mammogram: No     Social Determinants of Health     Financial Resource Strain: Medium Risk   • Difficulty of Paying Living Expenses: Somewhat hard   Food Insecurity: Not on file   Transportation Needs: No Transportation Needs   • Lack of Transportation (Medical): No   • Lack of Transportation (Non-Medical):  No   Physical Activity: Not on file   Stress: Not on file   Social Connections: Not on file   Intimate Partner Violence: Not on file   Housing Stability: Not on file      Medications and Allergies:     Current Outpatient Medications   Medication Sig Dispense Refill   • Alcohol Swabs (B-D SINGLE USE SWABS REGULAR) PADS USE AS DIRECTED FOUR TIMES DAILY 400 each 1   • aspirin 81 MG tablet Take 1 tablet by mouth daily     • Droplet Pen Needles 31G X 8 MM MISC USE AS DIRECTED FOR INJECTIONS FOUR TIMES DAILY 400 each 1   • glucose blood (Accu-Chek Marya Plus) test strip Use 1 each 4 (four) times a day Use as instructed 120 each 0   • glucose blood (Accu-Chek Marya Plus) test strip TEST BLOOD SUGAR FOUR TIMES DAILY 350 strip 5   • Insulin Syringe-Needle U-100 31G X 5/16" 0 3 ML MISC by Does not apply route     • latanoprost (XALATAN) 0 005 % ophthalmic solution Administer 1 drop to both eyes daily at bedtime 7 5 mL 1   • lisinopril (ZESTRIL) 20 mg tablet TAKE 1 TABLET EVERY DAY 90 tablet 0   • metFORMIN (GLUCOPHAGE-XR) 500 mg 24 hr tablet TAKE 2 TABLETS TWICE DAILY 360 tablet 0   • metoprolol tartrate (LOPRESSOR) 25 mg tablet TAKE 1 TABLET EVERY 12 HOURS 180 tablet 0   • NovoLOG FlexPen 100 units/mL injection pen INJECT 15 UNITS SUBCUTANEOUSLY THREE TIMES DAILY 45 mL 1   • pravastatin (PRAVACHOL) 10 mg tablet TAKE 1 TABLET EVERY DAY 90 tablet 0   • Toujeo SoloStar 300 units/mL CONCENTRATED U-300 injection pen (1-unit dial) INJECT 40 UNITS SUBCUTANEOUSLY EVERY DAY AT BEDTIME 9 mL 11     No current facility-administered medications for this visit  Allergies   Allergen Reactions   • Penicillins Anaphylaxis     Anaphylaxis  Reaction Date: 28Jun2007;       Immunizations:     Immunization History   Administered Date(s) Administered   • COVID-19 PFIZER VACCINE 0 3 ML IM 04/21/2021   • INFLUENZA 11/20/2014   • Influenza, high dose seasonal 0 7 mL 11/04/2022   • Influenza, seasonal, injectable 11/05/2007   • Pneumococcal Polysaccharide PPV23 02/14/2018      Health Maintenance:         Topic Date Due   • Colorectal Cancer Screening  Never done   • Hepatitis C Screening  Completed         Topic Date Due   • Pneumococcal Vaccine: 65+ Years (2 - PCV) 02/14/2019   • COVID-19 Vaccine (2 - Pfizer series) 05/12/2021      Medicare Screening Tests and Risk Assessments:     Toney Sanabria is here for his Subsequent Wellness visit  Last Medicare Wellness visit information reviewed, patient interviewed and updates made to the record today  Health Risk Assessment:   Patient rates overall health as good  Patient feels that their physical health rating is same  Patient is satisfied with their life  Eyesight was rated as same  Hearing was rated as same  Patient feels that their emotional and mental health rating is same  Patients states they are never, rarely angry  Patient states they are never, rarely unusually tired/fatigued  Pain experienced in the last 7 days has been none  Depression Screening:   PHQ-2 Score: 0      Nutrition:   Current diet is Diabetic  Medications:   Patient is currently taking over-the-counter supplements  OTC medications include: see medication list  Patient is able to manage medications       Activities of Daily Living (ADLs)/Instrumental Activities of Daily Living (IADLs):   Walk and transfer into and out of bed and chair?: Yes  Dress and groom yourself?: Yes    Bathe or shower yourself?: Yes    Feed yourself? Yes  Do your laundry/housekeeping?: Yes  Manage your money, pay your bills and track your expenses?: Yes  Make your own meals?: Yes    Do your own shopping?: Yes    Previous Hospitalizations:   Any hospitalizations or ED visits within the last 12 months?: No      Advance Care Planning:   Living will: No    Durable POA for healthcare: No    Five wishes given: No      Cognitive Screening:   Provider or family/friend/caregiver concerned regarding cognition?: No    PREVENTIVE SCREENINGS      Cardiovascular Screening:    General: Screening Current    Due for: Lipid Panel      Diabetes Screening:     General: Screening Not Indicated and History Diabetes    Due for: Blood Glucose      Colorectal Cancer Screening:       Due for: Cologuard      Prostate Cancer Screening:    General: Screening Current      Osteoporosis Screening:    General: Screening Not Indicated      Abdominal Aortic Aneurysm (AAA) Screening:    Risk factors include: age between 73-69 yo and tobacco use        Lung Cancer Screening:     General: Screening Not Indicated      Hepatitis C Screening:    General: Screening Current    Screening, Brief Intervention, and Referral to Treatment (SBIRT)    Screening      AUDIT-C Screenin) How often did you have a drink containing alcohol in the past year? never  2) How many drinks did you have on a typical day when you were drinking in the past year? 0  3) How often did you have 6 or more drinks on one occasion in the past year? never    AUDIT-C Score: 0  Interpretation: Score 0-3 (male): Negative screen for alcohol misuse    Brief Intervention  Alcohol & drug use screenings were reviewed  No concerns regarding substance use disorder identified       No exam data present     Physical Exam:     /80   Pulse 57   Resp 16   Ht 5' 5" (1 651 m)   Wt 57 6 kg (127 lb)   SpO2 98%   BMI 21 13 kg/m²     Physical Exam  Vitals and nursing note reviewed  Constitutional:       Appearance: He is well-developed  HENT:      Head: Normocephalic and atraumatic  Eyes:      Conjunctiva/sclera: Conjunctivae normal       Pupils: Pupils are equal, round, and reactive to light  Cardiovascular:      Rate and Rhythm: Normal rate and regular rhythm  Pulses: no weak pulses          Dorsalis pedis pulses are 2+ on the right side and 2+ on the left side  Heart sounds: Normal heart sounds  Pulmonary:      Effort: Pulmonary effort is normal       Breath sounds: Normal breath sounds  No wheezing or rales  Abdominal:      General: Bowel sounds are normal  There is no distension  Palpations: Abdomen is soft  Tenderness: There is no abdominal tenderness  Genitourinary:     Prostate: Enlarged  Musculoskeletal:         General: No tenderness  Normal range of motion  Cervical back: Normal range of motion and neck supple  Feet:      Right foot:      Skin integrity: Callus and dry skin present  No ulcer, skin breakdown, erythema or warmth  Left foot:      Skin integrity: Callus and dry skin present  No ulcer, skin breakdown, erythema or warmth  Skin:     General: Skin is warm and dry  Findings: No rash  Neurological:      Mental Status: He is alert and oriented to person, place, and time  Cranial Nerves: No cranial nerve deficit  Sensory: No sensory deficit  Coordination: Coordination normal    Psychiatric:         Behavior: Behavior normal          Thought Content: Thought content normal          Judgment: Judgment normal         Patient's shoes and socks removed  Right Foot/Ankle   Right Foot Inspection  Skin Exam: skin normal, skin intact, dry skin, callus and callus  No warmth, no erythema, no maceration, no abnormal color, no pre-ulcer and no ulcer  Toe Exam: ROM and strength within normal limits       Sensory   Monofilament testing: intact    Vascular  Capillary refills: < 3 seconds  The right DP pulse is 2+  Left Foot/Ankle  Left Foot Inspection  Skin Exam: skin normal, skin intact, dry skin and callus  No warmth, no erythema, no maceration, normal color, no pre-ulcer and no ulcer  Toe Exam: ROM and strength within normal limits  Sensory   Monofilament testing: intact    Vascular  Capillary refills: < 3 seconds  The left DP pulse is 2+       Assign Risk Category  No deformity present  No loss of protective sensation  No weak pulses  Risk: 0        Antonio Zapien MD

## 2022-11-04 NOTE — PATIENT INSTRUCTIONS
Medicare Preventive Visit Patient Instructions  Thank you for completing your Welcome to Medicare Visit or Medicare Annual Wellness Visit today  Your next wellness visit will be due in one year (11/5/2023)  The screening/preventive services that you may require over the next 5-10 years are detailed below  Some tests may not apply to you based off risk factors and/or age  Screening tests ordered at today's visit but not completed yet may show as past due  Also, please note that scanned in results may not display below  Preventive Screenings:  Service Recommendations Previous Testing/Comments   Colorectal Cancer Screening  · Colonoscopy    · Fecal Occult Blood Test (FOBT)/Fecal Immunochemical Test (FIT)  · Fecal DNA/Cologuard Test  · Flexible Sigmoidoscopy Age: 39-70 years old   Colonoscopy: every 10 years (May be performed more frequently if at higher risk)  OR  FOBT/FIT: every 1 year  OR  Cologuard: every 3 years  OR  Sigmoidoscopy: every 5 years  Screening may be recommended earlier than age 39 if at higher risk for colorectal cancer  Also, an individualized decision between you and your healthcare provider will decide whether screening between the ages of 74-80 would be appropriate   Colonoscopy: Not on file  FOBT/FIT: Not on file  Cologuard: Not on file  Sigmoidoscopy: Not on file          Prostate Cancer Screening Individualized decision between patient and health care provider in men between ages of 53-78   Medicare will cover every 12 months beginning on the day after your 50th birthday PSA: 1 6 ng/mL     Screening Current     Hepatitis C Screening Once for adults born between 1945 and 1965  More frequently in patients at high risk for Hepatitis C Hep C Antibody: 12/08/2021    Screening Current   Diabetes Screening 1-2 times per year if you're at risk for diabetes or have pre-diabetes Fasting glucose: 234 mg/dL (12/8/2021)  A1C: 9 8 % (12/8/2021)  Screening Not Indicated  History Diabetes   Cholesterol Screening Once every 5 years if you don't have a lipid disorder  May order more often based on risk factors  Lipid panel: 12/08/2021  Screening Current      Other Preventive Screenings Covered by Medicare:  1  Abdominal Aortic Aneurysm (AAA) Screening: covered once if your at risk  You're considered to be at risk if you have a family history of AAA or a male between the age of 73-68 who smoking at least 100 cigarettes in your lifetime  2  Lung Cancer Screening: covers low dose CT scan once per year if you meet all of the following conditions: (1) Age 50-69; (2) No signs or symptoms of lung cancer; (3) Current smoker or have quit smoking within the last 15 years; (4) You have a tobacco smoking history of at least 20 pack years (packs per day x number of years you smoked); (5) You get a written order from a healthcare provider  3  Glaucoma Screening: covered annually if you're considered high risk: (1) You have diabetes OR (2) Family history of glaucoma OR (3)  aged 48 and older OR (3)  American aged 72 and older  3  Osteoporosis Screening: covered every 2 years if you meet one of the following conditions: (1) Have a vertebral abnormality; (2) On glucocorticoid therapy for more than 3 months; (3) Have primary hyperparathyroidism; (4) On osteoporosis medications and need to assess response to drug therapy  5  HIV Screening: covered annually if you're between the age of 12-76  Also covered annually if you are younger than 13 and older than 72 with risk factors for HIV infection  For pregnant patients, it is covered up to 3 times per pregnancy      Immunizations:  Immunization Recommendations   Influenza Vaccine Annual influenza vaccination during flu season is recommended for all persons aged >= 6 months who do not have contraindications   Pneumococcal Vaccine   * Pneumococcal conjugate vaccine = PCV13 (Prevnar 13), PCV15 (Vaxneuvance), PCV20 (Prevnar 20)  * Pneumococcal polysaccharide vaccine = PPSV23 (Pneumovax) Adults 2364 years old: 1-3 doses may be recommended based on certain risk factors  Adults 72 years old: 1-2 doses may be recommended based off what pneumonia vaccine you previously received   Hepatitis B Vaccine 3 dose series if at intermediate or high risk (ex: diabetes, end stage renal disease, liver disease)   Tetanus (Td) Vaccine - COST NOT COVERED BY MEDICARE PART B Following completion of primary series, a booster dose should be given every 10 years to maintain immunity against tetanus  Td may also be given as tetanus wound prophylaxis  Tdap Vaccine - COST NOT COVERED BY MEDICARE PART B Recommended at least once for all adults  For pregnant patients, recommended with each pregnancy  Shingles Vaccine (Shingrix) - COST NOT COVERED BY MEDICARE PART B  2 shot series recommended in those aged 48 and above     Health Maintenance Due:      Topic Date Due   • Colorectal Cancer Screening  Never done   • Hepatitis C Screening  Completed     Immunizations Due:      Topic Date Due   • Pneumococcal Vaccine: 65+ Years (2 - PCV) 02/14/2019   • COVID-19 Vaccine (2 - Pfizer series) 05/12/2021   • Influenza Vaccine (1) 09/01/2022     Advance Directives   What are advance directives? Advance directives are legal documents that state your wishes and plans for medical care  These plans are made ahead of time in case you lose your ability to make decisions for yourself  Advance directives can apply to any medical decision, such as the treatments you want, and if you want to donate organs  What are the types of advance directives? There are many types of advance directives, and each state has rules about how to use them  You may choose a combination of any of the following:  · Living will: This is a written record of the treatment you want  You can also choose which treatments you do not want, which to limit, and which to stop at a certain time   This includes surgery, medicine, IV fluid, and tube feedings  · Durable power of  for healthcare [facility]): This is a written record that states who you want to make healthcare choices for you when you are unable to make them for yourself  This person, called a proxy, is usually a family member or a friend  You may choose more than 1 proxy  · Do not resuscitate (DNR) order:  A DNR order is used in case your heart stops beating or you stop breathing  It is a request not to have certain forms of treatment, such as CPR  A DNR order may be included in other types of advance directives  · Medical directive: This covers the care that you want if you are in a coma, near death, or unable to make decisions for yourself  You can list the treatments you want for each condition  Treatment may include pain medicine, surgery, blood transfusions, dialysis, IV or tube feedings, and a ventilator (breathing machine)  · Values history: This document has questions about your views, beliefs, and how you feel and think about life  This information can help others choose the care that you would choose  Why are advance directives important? An advance directive helps you control your care  Although spoken wishes may be used, it is better to have your wishes written down  Spoken wishes can be misunderstood, or not followed  Treatments may be given even if you do not want them  An advance directive may make it easier for your family to make difficult choices about your care  © Copyright [employer] 2018 Information is for End User's use only and may not be sold, redistributed or otherwise used for commercial purposes   All illustrations and images included in CareNotes® are the copyrighted property of A D A M , Inc  or [address] [place], [place] [address]

## 2022-11-21 DIAGNOSIS — Z79.4 TYPE 2 DIABETES MELLITUS WITH HYPERGLYCEMIA, WITH LONG-TERM CURRENT USE OF INSULIN (HCC): ICD-10-CM

## 2022-11-21 DIAGNOSIS — E11.65 TYPE 2 DIABETES MELLITUS WITH HYPERGLYCEMIA, WITH LONG-TERM CURRENT USE OF INSULIN (HCC): ICD-10-CM

## 2022-11-22 RX ORDER — LISINOPRIL 20 MG/1
TABLET ORAL
Qty: 90 TABLET | Refills: 0 | Status: SHIPPED | OUTPATIENT
Start: 2022-11-22

## 2022-11-22 RX ORDER — PRAVASTATIN SODIUM 10 MG
TABLET ORAL
Qty: 90 TABLET | Refills: 0 | Status: SHIPPED | OUTPATIENT
Start: 2022-11-22

## 2022-11-25 LAB — HBA1C MFR BLD HPLC: 8.7 %

## 2022-11-26 DIAGNOSIS — E10.9 TYPE 1 DIABETES MELLITUS WITHOUT COMPLICATION (HCC): ICD-10-CM

## 2022-11-28 RX ORDER — INSULIN ASPART 100 [IU]/ML
INJECTION, SOLUTION INTRAVENOUS; SUBCUTANEOUS
Qty: 45 ML | Refills: 1 | Status: SHIPPED | OUTPATIENT
Start: 2022-11-28

## 2023-02-14 DIAGNOSIS — Z79.4 TYPE 2 DIABETES MELLITUS WITH HYPERGLYCEMIA, WITH LONG-TERM CURRENT USE OF INSULIN (HCC): ICD-10-CM

## 2023-02-14 DIAGNOSIS — E11.65 TYPE 2 DIABETES MELLITUS WITH HYPERGLYCEMIA, WITH LONG-TERM CURRENT USE OF INSULIN (HCC): ICD-10-CM

## 2023-02-14 RX ORDER — METFORMIN HYDROCHLORIDE 500 MG/1
TABLET, EXTENDED RELEASE ORAL
Qty: 360 TABLET | Refills: 0 | Status: SHIPPED | OUTPATIENT
Start: 2023-02-14

## 2023-02-28 DIAGNOSIS — I10 ESSENTIAL HYPERTENSION: ICD-10-CM

## 2023-02-28 DIAGNOSIS — Z79.4 TYPE 2 DIABETES MELLITUS WITH HYPERGLYCEMIA, WITH LONG-TERM CURRENT USE OF INSULIN (HCC): ICD-10-CM

## 2023-02-28 DIAGNOSIS — E11.65 TYPE 2 DIABETES MELLITUS WITH HYPERGLYCEMIA, WITH LONG-TERM CURRENT USE OF INSULIN (HCC): ICD-10-CM

## 2023-02-28 RX ORDER — ISOPROPYL ALCOHOL 70 ML/100ML
SWAB TOPICAL
Qty: 400 EACH | Refills: 5 | Status: SHIPPED | OUTPATIENT
Start: 2023-02-28

## 2023-05-09 DIAGNOSIS — E11.65 TYPE 2 DIABETES MELLITUS WITH HYPERGLYCEMIA, WITH LONG-TERM CURRENT USE OF INSULIN (HCC): ICD-10-CM

## 2023-05-09 DIAGNOSIS — Z79.4 TYPE 2 DIABETES MELLITUS WITH HYPERGLYCEMIA, WITH LONG-TERM CURRENT USE OF INSULIN (HCC): ICD-10-CM

## 2023-05-09 RX ORDER — PRAVASTATIN SODIUM 10 MG
TABLET ORAL
Qty: 90 TABLET | Refills: 0 | Status: SHIPPED | OUTPATIENT
Start: 2023-05-09

## 2023-05-09 RX ORDER — LISINOPRIL 20 MG/1
TABLET ORAL
Qty: 90 TABLET | Refills: 0 | Status: SHIPPED | OUTPATIENT
Start: 2023-05-09

## 2023-06-09 ENCOUNTER — VBI (OUTPATIENT)
Dept: ADMINISTRATIVE | Facility: OTHER | Age: 73
End: 2023-06-09

## 2023-07-08 ENCOUNTER — HOSPITAL ENCOUNTER (EMERGENCY)
Facility: HOSPITAL | Age: 73
Discharge: HOME | End: 2023-07-08
Attending: EMERGENCY MEDICINE
Payer: COMMERCIAL

## 2023-07-08 VITALS
WEIGHT: 141.31 LBS | OXYGEN SATURATION: 99 % | SYSTOLIC BLOOD PRESSURE: 136 MMHG | BODY MASS INDEX: 23.52 KG/M2 | RESPIRATION RATE: 18 BRPM | DIASTOLIC BLOOD PRESSURE: 88 MMHG | HEART RATE: 66 BPM | TEMPERATURE: 98.3 F
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
## 2023-07-08 DIAGNOSIS — E16.0 HYPOGLYCEMIA DUE TO INSULIN: Primary | ICD-10-CM

## 2023-07-08 DIAGNOSIS — T38.3X5A HYPOGLYCEMIA DUE TO INSULIN: Primary | ICD-10-CM

## 2023-07-08 LAB
GLUCOSE SERPL-MCNC: 129 MG/DL (ref 65–140)
GLUCOSE SERPL-MCNC: 167 MG/DL (ref 65–140)
GLUCOSE SERPL-MCNC: 181 MG/DL (ref 65–140)

## 2023-07-08 PROCEDURE — 99285 EMERGENCY DEPT VISIT HI MDM: CPT

## 2023-07-08 PROCEDURE — 82948 REAGENT STRIP/BLOOD GLUCOSE: CPT

## 2023-07-08 PROCEDURE — 99284 EMERGENCY DEPT VISIT MOD MDM: CPT | Performed by: EMERGENCY MEDICINE

## 2023-07-08 NOTE — ED PROVIDER NOTES
History  Chief Complaint   Patient presents with   • Fall     Patient arrived via EMS from [facility], was found on the floor with AMS. Camera footage shows that he fell backwards, -HS, unknown LOC. 25g D-10 given for blood sugar of 21 for EMS       History provided by:  Relative, patient and medical records   70-year-old male with history of type 1 diabetes diagnosed in his 46s presents the emergency department after fall without head strike, when EMS arrived they noted the patient to have a sugar of 21. Patient thinks he took too much insulin with lunch, and did not eat enough food because he was in a rush to go to the store. Prior to the fall, he was feeling lightheaded and dizzy and a little confused. This has happened to him before, however it was several years ago. Camera footage shows he did not hit his head. He denies any chest pain, sob, abd pain. After discussing insulin management with the patient, it appears that he has been struggling with figuring out how much insulin to give himself. His last visit with his PCP was November 2022, and review of that note shows that at that time, patient was taking 15 units of short acting 3 times a day at before meals, as well as 40 units of long-acting at night. Patient tells me he has been taking 20 units of long-acting at night, waking up with variable blood glucose in the morning, sometimes high but also sometimes low. Today, he gave himself 12 units of short acting before lunch, and his glucose was 70 at that time. He only ate a hot dog. Patient says he does not keep a log of how much insulin he gives himself, nor does he use a sliding scale. He says he guesses how much to give himself. He recognizes the need to visit his PCP for reevaluation, however says that he does not have a car and does not like asking people for rides. His son is in the room and states he will assist as needed. Otherwise, the patient says that he eats usually very healthy. He denies food insecurity, however without the use of a vehicle, does recognize that he does not get to go to the store as often as he would like and sometimes cuts down his diet because of that. He denies any recent illness, no fevers. No abdominal pain. Prior to Admission Medications   Prescriptions Last Dose Informant Patient Reported? Taking?    Alcohol Swabs (DropSafe Alcohol Prep) 70 % PADS   No No   Sig: USE AS DIRECTED FOUR TIMES DAILY   Droplet Pen Needles 31G X 8 MM MISC   No No   Sig: USE AS DIRECTED FOR INJECTIONS FOUR TIMES DAILY   Insulin Syringe-Needle U-100 31G X 5/16" 0.3 ML MISC  Self Yes No   Sig: by Does not apply route   NovoLOG FlexPen 100 units/mL injection pen   No No   Sig: INJECT  15 UNITS SUBCUTANEOUSLY THREE TIMES DAILY   Toujeo SoloStar 300 units/mL CONCENTRATED U-300 injection pen (1-unit dial)   No No   Sig: INJECT 40 UNITS SUBCUTANEOUSLY EVERY DAY AT BEDTIME   aspirin 81 MG tablet  Self Yes No   Sig: Take 1 tablet by mouth daily   glucose blood (Accu-Chek Marya Plus) test strip   No No   Sig: Use 1 each 4 (four) times a day Use as instructed   glucose blood (Accu-Chek Marya Plus) test strip   No No   Sig: TEST BLOOD SUGAR FOUR TIMES DAILY   latanoprost (XALATAN) 0.005 % ophthalmic solution   No No   Sig: Administer 1 drop to both eyes daily at bedtime   lisinopril (ZESTRIL) 20 mg tablet   No No   Sig: TAKE 1 TABLET EVERY DAY   metFORMIN (GLUCOPHAGE-XR) 500 mg 24 hr tablet   No No   Sig: TAKE 2 TABLETS TWICE A DAY   metoprolol tartrate (LOPRESSOR) 25 mg tablet   No No   Sig: TAKE 1 TABLET EVERY 12 HOURS   pravastatin (PRAVACHOL) 10 mg tablet   No No   Sig: TAKE 1 TABLET EVERY DAY      Facility-Administered Medications: None       Past Medical History:   Diagnosis Date   • Anxiety    • Arthritis    • Depression    • Diabetes mellitus (HCC)    • Heart attack (HCC)    • Heart murmur    • Hepatitis    • Hyperlipidemia    • Palpitations    • Peripheral vascular disease (HCC)    • Stroke [facility])        Past Surgical History:   Procedure Laterality Date   • HERNIA REPAIR     • KNEE SURGERY Right 1977    [place]       Family History   Problem Relation Age of Onset   • Cancer Father         lymphoma, brain, lung & liver   • Mental illness Father    • Depression Father    • Depression Sister    • Depression Brother    • Stroke Maternal Grandfather    • Stroke Paternal Grandmother    • Diabetes Paternal Grandmother    • Heart attack Paternal Grandmother    • Depression Brother      I have reviewed and agree with the history as documented. E-Cigarette/Vaping     E-Cigarette/Vaping Substances     Social History     Tobacco Use   • Smoking status: Former     Packs/day: 1.00     Years: 50.00     Total pack years: 50.00     Types: Cigarettes   • Smokeless tobacco: Never   • Tobacco comments:     1 PPW   Substance Use Topics   • Alcohol use: Yes        Review of Systems   Constitutional: Negative for chills and fever. HENT: Negative for ear pain and sore throat. Eyes: Negative for pain and visual disturbance. Respiratory: Negative for cough and shortness of breath. Cardiovascular: Negative for chest pain and leg swelling. Gastrointestinal: Negative for abdominal pain, blood in stool, constipation, diarrhea, nausea and vomiting. Genitourinary: Negative for dysuria and hematuria. Musculoskeletal: Negative for neck pain and neck stiffness. Neurological: Positive for dizziness and light-headedness. Negative for syncope and weakness. All other systems reviewed and are negative.       Physical Exam  ED Triage Vitals   Temperature Pulse Respirations Blood Pressure SpO2   07/08/23 1754 07/08/23 1539 07/08/23 1539 07/08/23 1539 07/08/23 1539   98.3 °F (36.8 °C) 66 18 136/88 99 %      Temp Source Heart Rate Source Patient Position - Orthostatic VS BP Location FiO2 (%)   07/08/23 1754 07/08/23 1539 07/08/23 1539 07/08/23 1539 --   Oral Monitor Lying Right arm       Pain Score       07/08/23 1539 No Pain             Orthostatic Vital Signs  Vitals:    07/08/23 1539   BP: 136/88   Pulse: 66   Patient Position - Orthostatic VS: Lying       Physical Exam  Vitals and nursing note reviewed. Constitutional:       General: He is not in acute distress. Appearance: Normal appearance. He is well-developed. He is not ill-appearing, toxic-appearing or diaphoretic. HENT:      Head: Normocephalic and atraumatic. Eyes:      Extraocular Movements: Extraocular movements intact. Conjunctiva/sclera: Conjunctivae normal.   Cardiovascular:      Rate and Rhythm: Normal rate and regular rhythm. Heart sounds: Normal heart sounds. No murmur heard. Pulmonary:      Effort: Pulmonary effort is normal. No respiratory distress. Breath sounds: Normal breath sounds. No wheezing, rhonchi or rales. Abdominal:      General: Abdomen is flat. There is no distension. Palpations: Abdomen is soft. Tenderness: There is no abdominal tenderness. There is no guarding or rebound. Musculoskeletal:         General: No swelling. Cervical back: Normal range of motion and neck supple. Right lower leg: No edema. Left lower leg: No edema. Skin:     General: Skin is warm and dry. Capillary Refill: Capillary refill takes less than 2 seconds. Findings: No rash. Neurological:      General: No focal deficit present. Mental Status: He is alert and oriented to person, place, and time. Cranial Nerves: No cranial nerve deficit.    Psychiatric:         Mood and Affect: Mood normal.         Behavior: Behavior normal.         ED Medications  Medications - No data to display    Diagnostic Studies  Results Reviewed     Procedure Component Value Units Date/Time    Fingerstick Glucose (POCT) [[id number]]  (Abnormal) Collected: 07/08/23 1754    Lab Status: Final result Updated: 07/08/23 1758     POC Glucose 181 mg/dl     Fingerstick Glucose (POCT) [[id number]]  (Abnormal) Collected: 07/08/23 7167 Lab Status: Final result Updated: 07/08/23 1703     POC Glucose 167 mg/dl     Fingerstick Glucose (POCT) [[id number]]  (Normal) Collected: 07/08/23 1538    Lab Status: Final result Updated: 07/08/23 1539     POC Glucose 129 mg/dl                  No orders to display         Procedures  Procedures      ED Course  ED Course as of 07/09/23 1449   Sat Jul 08, 2023   1749 POC Glucose(!): 167   1749 Patient eating snacks and juice                             SBIRT 22yo+    Flowsheet Row Most Recent Value   Initial Alcohol Screen: US AUDIT-C     1. How often do you have a drink containing alcohol? 0 Filed at: 07/08/2023 1542   Audit-C Score 0 Filed at: 07/08/2023 1542   SARITA: How many times in the past year have you. .. Used an illegal drug or used a prescription medication for non-medical reasons? Never Filed at: 07/08/2023 1542                Medical Decision Making  51-year-old male with history of type 1 diabetes diagnosed in his 46s presents the emergency department after fall without head strike, when EMS arrived they noted the patient to have a sugar of 21. He was given half of an amp of D50 and repeat sugar here is 167. Here in the department, patient is hemodynamically stable and afebrile, nontachycardic with normal work of breathing satting 99% on room air. He is alert and oriented x3, speaking to me in full sentences. He is not in acute distress. His physical exam is unremarkable. Patient is given some juice and food, and repeat sugar is in the 180s. It does appear that patient became hypoglycemic due to giving himself too much short acting insulin with lunch compounded by not eating very much. Patient does recognize that he has been struggling with knowing how much insulin to give himself lately. Strongly recommend that patient follow-up with his PCP within the next week to discuss appropriate changes. Encouraged him to hold any dose of insulin for a blood glucose less than 100.   He is amenable with this plan. Discussion of strict return precautions. Patient is discharged home in stable condition. Amount and/or Complexity of Data Reviewed  Labs: ordered. Decision-making details documented in ED Course. Disposition  Final diagnoses:   Hypoglycemia due to insulin   Fall, initial encounter     Time reflects when diagnosis was documented in both MDM as applicable and the Disposition within this note     Time User Action Codes Description Comment    7/8/2023  6:12 PM Jackmaria dolores Radhat Add [E16.0,  T38.3X5A] Hypoglycemia due to insulin     7/8/2023  6:15 PM Hugh Garciat Add [D98. Julian Lang, initial encounter       ED Disposition     ED Disposition   Discharge    Condition   Stable    Date/Time   Sat Jul 8, 2023  6:12 PM    Comment   Raven Zepeda discharge to home/self care.                Follow-up Information     Follow up With Specialties Details Why Contact Info    Ronaldo Luna MD Family Medicine Call in 2 days schedule appointment ASAP for re-evaluation of insulin administration and diabetes management [address] [place] [employer] [address] [place] [address]  [phone]            Discharge Medication List as of 7/8/2023  6:16 PM      CONTINUE these medications which have NOT CHANGED    Details   Alcohol Swabs (DropSafe Alcohol Prep) 70 % PADS USE AS DIRECTED FOUR TIMES DAILY, Normal      aspirin 81 MG tablet Take 1 tablet by mouth daily, Historical Med      Droplet Pen Needles 31G X 8 MM MISC USE AS DIRECTED FOR INJECTIONS FOUR TIMES DAILY, Normal      !! glucose blood (Accu-Chek Marya Plus) test strip Use 1 each 4 (four) times a day Use as instructed, Starting Fri 8/20/2021, Normal      !! glucose blood (Accu-Chek Marya Plus) test strip TEST BLOOD SUGAR FOUR TIMES DAILY, Normal      Insulin Syringe-Needle U-100 31G X 5/16" 0.3 ML MISC by Does not apply route, Starting Wed 8/31/2011, Historical Med      latanoprost (XALATAN) 0.005 % ophthalmic solution Administer 1 drop to both eyes daily at bedtime, Starting Wed 9/2/2020, Normal      lisinopril (ZESTRIL) 20 mg tablet TAKE 1 TABLET EVERY DAY, Normal      metFORMIN (GLUCOPHAGE-XR) 500 mg 24 hr tablet TAKE 2 TABLETS TWICE A DAY, Normal      metoprolol tartrate (LOPRESSOR) 25 mg tablet TAKE 1 TABLET EVERY 12 HOURS, Normal      NovoLOG FlexPen 100 units/mL injection pen INJECT  15 UNITS SUBCUTANEOUSLY THREE TIMES DAILY, Normal      pravastatin (PRAVACHOL) 10 mg tablet TAKE 1 TABLET EVERY DAY, Normal      Toujeo SoloStar 300 units/mL CONCENTRATED U-300 injection pen (1-unit dial) INJECT 40 UNITS SUBCUTANEOUSLY EVERY DAY AT BEDTIME, Normal       !! - Potential duplicate medications found. Please discuss with provider. No discharge procedures on file. PDMP Review     None           ED Provider  Attending physically available and evaluated Epi Malave. I managed the patient along with the ED Attending.     Electronically Signed by         Jack Amin MD  07/09/23 2009

## 2023-07-08 NOTE — ED ATTENDING ATTESTATION
7/8/2023  I, Ata Kelly MD, saw and evaluated the patient. I have discussed the patient with the resident/non-physician practitioner and agree with the resident's/non-physician practitioner's findings, Plan of Care, and MDM as documented in the resident's/non-physician practitioner's note, except where noted. All available labs and Radiology studies were reviewed. I was present for key portions of any procedure(s) performed by the resident/non-physician practitioner and I was immediately available to provide assistance. At this point I agree with the current assessment done in the Emergency Department. I have conducted an independent evaluation of this patient a history and physical is as follows:    53-year-old male with a history of insulin-dependent diabetes presented for evaluation after an episode of hypoglycemia at home this afternoon. He uses both daily long-acting and mealtime insulin. Not followed up with PCP for a long time. States he often adjusts his insulin dose to his blood sugar levels but does not follow a strict sliding scale. Using about 20 units of long-acting in the evening. Today he reports his blood glucose was 70 prior to lunch. He used 12 units of fast acting insulin and then reports not eating much because he was rushing out the door. He was later found on the floor confused blood glucose about 21. Apparently home camera showed that he had fallen backwards but there was no head strike. He was given 25 g dextrose by EMS with normalization of blood glucose. Awake and alert on arrival here without any complaints. No evidence of traumatic injury. Will feed and reevaluate glucose. ED Course  ED Course as of 07/08/23 1809   Sat Jul 08, 2023   1804 POC Glucose(!): 181  Repeat blood glucose remains normal after eating. Stable for discharge home.          Critical Care Time  Procedures

## 2023-07-08 NOTE — ED NOTES
Emergency Contact Information   Name: Cherelle Sherman   Relationship: Son   Phone Number: [phone]         Rosina Diego RN  07/08/23 4993

## 2023-07-08 NOTE — DISCHARGE INSTRUCTIONS
Please call your doctor first thing Monday morning to schedule an appointment to go over insulin dosing and discuss needed changes  Please HOLD insulin in blood sugar is less than 100

## 2023-07-25 DIAGNOSIS — E11.65 TYPE 2 DIABETES MELLITUS WITH HYPERGLYCEMIA, WITH LONG-TERM CURRENT USE OF INSULIN (HCC): ICD-10-CM

## 2023-07-25 DIAGNOSIS — Z79.4 TYPE 2 DIABETES MELLITUS WITH HYPERGLYCEMIA, WITH LONG-TERM CURRENT USE OF INSULIN (HCC): ICD-10-CM

## 2023-07-25 DIAGNOSIS — E10.9 TYPE 1 DIABETES MELLITUS WITHOUT COMPLICATION (HCC): ICD-10-CM

## 2023-07-25 RX ORDER — INSULIN ASPART 100 [IU]/ML
INJECTION, SOLUTION INTRAVENOUS; SUBCUTANEOUS
Qty: 45 ML | Refills: 1 | Status: SHIPPED | OUTPATIENT
Start: 2023-07-25

## 2023-07-25 RX ORDER — METFORMIN HYDROCHLORIDE 500 MG/1
TABLET, EXTENDED RELEASE ORAL
Qty: 360 TABLET | Refills: 0 | Status: SHIPPED | OUTPATIENT
Start: 2023-07-25
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
## 2023-08-15 DIAGNOSIS — I10 ESSENTIAL HYPERTENSION: ICD-10-CM

## 2023-08-17 ENCOUNTER — VBI (OUTPATIENT)
Dept: ADMINISTRATIVE | Facility: OTHER | Age: 73
End: 2023-08-17

## 2023-10-09 DIAGNOSIS — Z79.4 TYPE 2 DIABETES MELLITUS WITH HYPERGLYCEMIA, WITH LONG-TERM CURRENT USE OF INSULIN (HCC): ICD-10-CM

## 2023-10-09 DIAGNOSIS — E11.65 TYPE 2 DIABETES MELLITUS WITH HYPERGLYCEMIA, WITH LONG-TERM CURRENT USE OF INSULIN (HCC): ICD-10-CM

## 2023-10-09 RX ORDER — LISINOPRIL 20 MG/1
TABLET ORAL
Qty: 90 TABLET | Refills: 10 | Status: SHIPPED | OUTPATIENT
Start: 2023-10-09

## 2023-10-09 RX ORDER — PRAVASTATIN SODIUM 10 MG
TABLET ORAL
Qty: 90 TABLET | Refills: 10 | Status: SHIPPED | OUTPATIENT
Start: 2023-10-09

## 2023-11-03 DIAGNOSIS — E11.65 TYPE 2 DIABETES MELLITUS WITH HYPERGLYCEMIA, WITH LONG-TERM CURRENT USE OF INSULIN (HCC): ICD-10-CM

## 2023-11-03 DIAGNOSIS — Z79.4 TYPE 2 DIABETES MELLITUS WITH HYPERGLYCEMIA, WITH LONG-TERM CURRENT USE OF INSULIN (HCC): ICD-10-CM

## 2023-11-03 RX ORDER — PEN NEEDLE, DIABETIC 31 GX5/16"
NEEDLE, DISPOSABLE MISCELLANEOUS
Qty: 400 EACH | Refills: 10 | Status: SHIPPED | OUTPATIENT
Start: 2023-11-03

## 2023-11-17 DIAGNOSIS — E10.9 TYPE 1 DIABETES MELLITUS WITHOUT COMPLICATION (HCC): ICD-10-CM

## 2023-11-17 RX ORDER — BLOOD SUGAR DIAGNOSTIC
STRIP MISCELLANEOUS
Qty: 350 STRIP | Refills: 0 | Status: SHIPPED | OUTPATIENT
Start: 2023-11-17

## 2023-12-01 DIAGNOSIS — R76.8 HEPATITIS C ANTIBODY TEST POSITIVE: Primary | ICD-10-CM

## 2023-12-01 DIAGNOSIS — Z12.5 SCREENING FOR PROSTATE CANCER: ICD-10-CM

## 2023-12-01 DIAGNOSIS — E55.9 VITAMIN D DEFICIENCY: ICD-10-CM

## 2023-12-01 DIAGNOSIS — E10.9 TYPE 1 DIABETES MELLITUS WITHOUT COMPLICATION (HCC): ICD-10-CM

## 2023-12-01 DIAGNOSIS — E11.65 TYPE 2 DIABETES MELLITUS WITH HYPERGLYCEMIA, WITH LONG-TERM CURRENT USE OF INSULIN (HCC): ICD-10-CM

## 2023-12-01 DIAGNOSIS — Z79.4 TYPE 2 DIABETES MELLITUS WITH HYPERGLYCEMIA, WITH LONG-TERM CURRENT USE OF INSULIN (HCC): ICD-10-CM

## 2023-12-04 ENCOUNTER — APPOINTMENT (OUTPATIENT)
Dept: LAB | Facility: CLINIC | Age: 73
End: 2023-12-04
Payer: COMMERCIAL

## 2023-12-04 ENCOUNTER — RA CDI HCC (OUTPATIENT)
Dept: OTHER | Facility: HOSPITAL | Age: 73
End: 2023-12-04

## 2023-12-04 DIAGNOSIS — E10.9 TYPE 1 DIABETES MELLITUS WITHOUT COMPLICATION (HCC): ICD-10-CM

## 2023-12-04 DIAGNOSIS — E55.9 VITAMIN D DEFICIENCY: ICD-10-CM

## 2023-12-04 DIAGNOSIS — R76.8 HEPATITIS C ANTIBODY TEST POSITIVE: ICD-10-CM

## 2023-12-04 DIAGNOSIS — Z12.5 SCREENING FOR PROSTATE CANCER: ICD-10-CM

## 2023-12-04 NOTE — PROGRESS NOTES
[address] [facility] coding opportunities          Chart Reviewed number of suggestions sent to Provider: 1  E10.65     Patients Insurance     Medicare Insurance: [facility] Advantage

## 2023-12-05 ENCOUNTER — APPOINTMENT (OUTPATIENT)
Dept: LAB | Facility: CLINIC | Age: 73
End: 2023-12-05
Payer: COMMERCIAL

## 2023-12-05 DIAGNOSIS — E10.69 TYPE 1 DIABETES MELLITUS WITH OTHER SPECIFIED COMPLICATION (HCC): Primary | ICD-10-CM

## 2023-12-05 LAB
25(OH)D3 SERPL-MCNC: 26.3 NG/ML (ref 30–100)
ALBUMIN SERPL BCP-MCNC: 4.2 G/DL (ref 3.5–5)
ALP SERPL-CCNC: 24 U/L (ref 34–104)
ALT SERPL W P-5'-P-CCNC: 21 U/L (ref 7–52)
ANION GAP SERPL CALCULATED.3IONS-SCNC: 10 MMOL/L
AST SERPL W P-5'-P-CCNC: 23 U/L (ref 13–39)
BASOPHILS # BLD AUTO: 0.05 THOUSANDS/ÂΜL (ref 0–0.1)
BASOPHILS NFR BLD AUTO: 1 % (ref 0–1)
BILIRUB SERPL-MCNC: 0.35 MG/DL (ref 0.2–1)
BUN SERPL-MCNC: 19 MG/DL (ref 5–25)
CALCIUM SERPL-MCNC: 9.5 MG/DL (ref 8.4–10.2)
CHLORIDE SERPL-SCNC: 106 MMOL/L (ref 96–108)
CHOLEST SERPL-MCNC: 133 MG/DL
CO2 SERPL-SCNC: 24 MMOL/L (ref 21–32)
CREAT SERPL-MCNC: 1.05 MG/DL (ref 0.6–1.3)
CREAT UR-MCNC: 90.9 MG/DL
EOSINOPHIL # BLD AUTO: 0 THOUSAND/ÂΜL (ref 0–0.61)
EOSINOPHIL NFR BLD AUTO: 0 % (ref 0–6)
ERYTHROCYTE [DISTWIDTH] IN BLOOD BY AUTOMATED COUNT: 14.1 % (ref 11.6–15.1)
GFR SERPL CREATININE-BSD FRML MDRD: 71 ML/MIN/1.73SQ M
GLUCOSE P FAST SERPL-MCNC: 59 MG/DL (ref 65–99)
HCT VFR BLD AUTO: 41.4 % (ref 36.5–49.3)
HDLC SERPL-MCNC: 47 MG/DL
HGB BLD-MCNC: 13.2 G/DL (ref 12–17)
IMM GRANULOCYTES # BLD AUTO: 0.02 THOUSAND/UL (ref 0–0.2)
IMM GRANULOCYTES NFR BLD AUTO: 0 % (ref 0–2)
LDLC SERPL CALC-MCNC: 58 MG/DL (ref 0–100)
LYMPHOCYTES # BLD AUTO: 2.31 THOUSANDS/ÂΜL (ref 0.6–4.47)
LYMPHOCYTES NFR BLD AUTO: 24 % (ref 14–44)
MCH RBC QN AUTO: 30.6 PG (ref 26.8–34.3)
MCHC RBC AUTO-ENTMCNC: 31.9 G/DL (ref 31.4–37.4)
MCV RBC AUTO: 96 FL (ref 82–98)
MICROALBUMIN UR-MCNC: 7.8 MG/L
MICROALBUMIN/CREAT 24H UR: 9 MG/G CREATININE (ref 0–30)
MONOCYTES # BLD AUTO: 0.65 THOUSAND/ÂΜL (ref 0.17–1.22)
MONOCYTES NFR BLD AUTO: 7 % (ref 4–12)
NEUTROPHILS # BLD AUTO: 6.74 THOUSANDS/ÂΜL (ref 1.85–7.62)
NEUTS SEG NFR BLD AUTO: 68 % (ref 43–75)
NRBC BLD AUTO-RTO: 0 /100 WBCS
PLATELET # BLD AUTO: 309 THOUSANDS/UL (ref 149–390)
PMV BLD AUTO: 11.1 FL (ref 8.9–12.7)
POTASSIUM SERPL-SCNC: 5 MMOL/L (ref 3.5–5.3)
PROT SERPL-MCNC: 7.3 G/DL (ref 6.4–8.4)
PSA SERPL-MCNC: 2.46 NG/ML (ref 0–4)
RBC # BLD AUTO: 4.32 MILLION/UL (ref 3.88–5.62)
SODIUM SERPL-SCNC: 140 MMOL/L (ref 135–147)
TRIGL SERPL-MCNC: 140 MG/DL
WBC # BLD AUTO: 9.77 THOUSAND/UL (ref 4.31–10.16)

## 2023-12-05 PROCEDURE — 80053 COMPREHEN METABOLIC PANEL: CPT

## 2023-12-05 PROCEDURE — G0103 PSA SCREENING: HCPCS

## 2023-12-05 PROCEDURE — 36415 COLL VENOUS BLD VENIPUNCTURE: CPT

## 2023-12-05 PROCEDURE — 85025 COMPLETE CBC W/AUTO DIFF WBC: CPT

## 2023-12-05 PROCEDURE — 82043 UR ALBUMIN QUANTITATIVE: CPT

## 2023-12-05 PROCEDURE — 80061 LIPID PANEL: CPT

## 2023-12-05 PROCEDURE — 82570 ASSAY OF URINE CREATININE: CPT

## 2023-12-05 PROCEDURE — 83036 HEMOGLOBIN GLYCOSYLATED A1C: CPT

## 2023-12-05 PROCEDURE — 82306 VITAMIN D 25 HYDROXY: CPT

## 2023-12-05 PROCEDURE — 87522 HEPATITIS C REVRS TRNSCRPJ: CPT

## 2023-12-06 LAB
EST. AVERAGE GLUCOSE BLD GHB EST-MCNC: 214 MG/DL
HBA1C MFR BLD: 9.1 %

## 2023-12-07 LAB
HCV RNA SERPL NAA+PROBE-ACNC: NORMAL IU/ML
TEST INFORMATION: NORMAL

## 2023-12-07 RX ORDER — ESCITALOPRAM OXALATE 20 MG/1
TABLET ORAL
COMMUNITY

## 2023-12-08 ENCOUNTER — OFFICE VISIT (OUTPATIENT)
Dept: FAMILY MEDICINE CLINIC | Facility: CLINIC | Age: 73
End: 2023-12-08
Payer: COMMERCIAL

## 2023-12-08 VITALS
HEART RATE: 65 BPM | SYSTOLIC BLOOD PRESSURE: 122 MMHG | HEIGHT: 65 IN | WEIGHT: 141 LBS | BODY MASS INDEX: 23.49 KG/M2 | DIASTOLIC BLOOD PRESSURE: 78 MMHG | RESPIRATION RATE: 16 BRPM | OXYGEN SATURATION: 97 %

## 2023-12-08 DIAGNOSIS — E10.69 TYPE 1 DIABETES MELLITUS WITH OTHER SPECIFIED COMPLICATION (HCC): ICD-10-CM

## 2023-12-08 DIAGNOSIS — Z23 ENCOUNTER FOR IMMUNIZATION: Primary | ICD-10-CM

## 2023-12-08 DIAGNOSIS — I10 ESSENTIAL HYPERTENSION: ICD-10-CM

## 2023-12-08 DIAGNOSIS — Z00.00 WELL ADULT EXAM: ICD-10-CM

## 2023-12-08 DIAGNOSIS — Z12.11 SCREENING FOR MALIGNANT NEOPLASM OF COLON: ICD-10-CM

## 2023-12-08 DIAGNOSIS — E10.9 TYPE 1 DIABETES MELLITUS WITHOUT COMPLICATION (HCC): ICD-10-CM

## 2023-12-08 DIAGNOSIS — R35.1 BENIGN PROSTATIC HYPERPLASIA WITH NOCTURIA: ICD-10-CM

## 2023-12-08 DIAGNOSIS — N40.1 BENIGN PROSTATIC HYPERPLASIA WITH NOCTURIA: ICD-10-CM

## 2023-12-08 LAB
LEFT EYE DIABETIC RETINOPATHY: ABNORMAL
LEFT EYE IMAGE QUALITY: ABNORMAL
LEFT EYE MACULAR EDEMA: ABNORMAL
LEFT EYE OTHER RETINOPATHY: ABNORMAL
RIGHT EYE DIABETIC RETINOPATHY: ABNORMAL
RIGHT EYE IMAGE QUALITY: ABNORMAL
RIGHT EYE MACULAR EDEMA: ABNORMAL
RIGHT EYE OTHER RETINOPATHY: ABNORMAL
SEVERITY (EYE EXAM): ABNORMAL

## 2023-12-08 PROCEDURE — G0439 PPPS, SUBSEQ VISIT: HCPCS | Performed by: FAMILY MEDICINE

## 2023-12-08 PROCEDURE — G0008 ADMIN INFLUENZA VIRUS VAC: HCPCS | Performed by: FAMILY MEDICINE

## 2023-12-08 PROCEDURE — G0009 ADMIN PNEUMOCOCCAL VACCINE: HCPCS | Performed by: FAMILY MEDICINE

## 2023-12-08 PROCEDURE — 90677 PCV20 VACCINE IM: CPT | Performed by: FAMILY MEDICINE

## 2023-12-08 PROCEDURE — 99214 OFFICE O/P EST MOD 30 MIN: CPT | Performed by: FAMILY MEDICINE

## 2023-12-08 PROCEDURE — 90662 IIV NO PRSV INCREASED AG IM: CPT | Performed by: FAMILY MEDICINE

## 2023-12-08 NOTE — PROGRESS NOTES
Assessment and Plan:     Problem List Items Addressed This Visit     Essential hypertension    Type 1 diabetes mellitus without complication ([address])    Relevant Medications    Continuous Blood Gluc  (FreeStyle [place] 2 [place]) SG    Continuous Blood Gluc Sensor (FreeStyle Daniel 2 Sensor) MISC    Benign prostatic hyperplasia with nocturia   Other Visit Diagnoses     Encounter for immunization    -  Primary    Relevant Orders    Pneumococcal Conjugate Vaccine 20-valent (Pcv20) (Completed)    influenza vaccine, high-dose, PF 0.7 mL (FLUZONE HIGH-DOSE) (Completed)    Type 1 diabetes mellitus with other specified complication ([address])        Relevant Orders    IRIS Diabetic eye exam    Screening for malignant neoplasm of colon        Relevant Orders    Ambulatory Referral to Gastroenterology    Well adult exam              Depression Screening and Follow-up Plan: Patient was screened for depression during today's encounter. They screened negative with a PHQ-2 score of 0. Preventive health issues were discussed with patient, and age appropriate screening tests were ordered as noted in patient's After Visit Summary. Personalized health advice and appropriate referrals for health education or preventive services given if needed, as noted in patient's After Visit Summary. History of Present Illness:     Patient presents for a Medicare Wellness Visit    HPI   Kleber Machuca is a 42-year-old male who presents for a Medicare visit. He is experiencing more numbness in the fingers of both hands compared to his toes. He attributes it to neuropathy. His feet are in a satisfactory condition. He has not undergone any colonoscopy or Cologuard tests. He reports occasional loose bowel movements. He is currently taking an enzyme pill for management. Patient experiences decreased blood glucose levels in the morning. He is currently on a regimen of 18 units of Toujeo long-acting insulin administered at night.   He takes 10 units of NovoLog if his blood glucose levels are within the normal range. He has expressed concern when his blood sugar levels reach 300 mg/dL, as they are typically high at 250 mg/dL. He consumes meals 3 times a day. He has not seen an eye doctor in 2 years. Patient has not reported any significant pain. He takes vitamin D 10,000 International Units every day. Patient Care Team:  Santiago Pisano MD as PCP - General     Review of Systems:     Review of Systems    Constitutional: Negative for activity change, appetite change and fever. HENT: Negative for congestion, nosebleeds and trouble swallowing. Eyes: Negative for itching. Respiratory: Negative for cough and chest tightness. Cardiovascular: Negative for chest pain and palpitations. Gastrointestinal: Positive for occasional loose bowel movements. Negative for abdominal pain, constipation, diarrhea and nausea. Endocrine: Negative for cold intolerance. Genitourinary: Negative for frequency. Musculoskeletal: Negative for gait problem and joint swelling. Skin: Negative for rash. Allergic/Immunologic: Negative for immunocompromised state. Neurological: Positive for numbness in fingers in both hands and toes. Negative for dizziness, tremors, seizures, syncope and headaches. Psychiatric/Behavioral: Negative for hallucinations and suicidal ideas.      Problem List:     Patient Active Problem List   Diagnosis   • Takotsubo cardiomyopathy   • Essential hypertension   • History of cardiac cath   • Atherosclerosis of iliac artery   • Type 1 diabetes mellitus without complication (HCC)   • Benign prostatic hyperplasia with nocturia      Past Medical and Surgical History:     Past Medical History:   Diagnosis Date   • Anxiety    • Arthritis    • Depression    • Diabetes mellitus ([address])    • Heart attack ([address])    • Heart murmur    • Hepatitis    • Hyperlipidemia    • Palpitations    • Peripheral vascular disease ([address])    • Stroke [facility])      Past Surgical History:   Procedure Laterality Date   • HERNIA REPAIR     • KNEE SURGERY Right 1977    Tendon      Family History:     Family History   Problem Relation Age of Onset   • Cancer Father         lymphoma, brain, lung & liver   • Mental illness Father    • Depression Father    • Depression Sister    • Depression Brother    • Stroke Maternal Grandfather    • Stroke Paternal Grandmother    • Diabetes Paternal Grandmother    • Heart attack Paternal Grandmother    • Depression Brother       Social History:     Social History     Socioeconomic History   • Marital status: /Civil Union     Spouse name: None   • Number of children: None   • Years of education: None   • Highest education level: None   Occupational History   • Occupation: Retired    Tobacco Use   • Smoking status: Former     Packs/day: 1.00     Years: 50.00     Total pack years: 50.00     Types: Cigarettes   • Smokeless tobacco: Never   • Tobacco comments:     1 PPW   Substance and Sexual Activity   • Alcohol use: Yes   • Drug use: None     Comment: Denied   • Sexual activity: None   Other Topics Concern   • None   Social History Narrative    Most recent tobacco use screenin2020    Do you currently or have you served in the [address] [place] [address] [employer]: No    Were you activated, into active duty, as a member of the [employer] or as a Reservist: No    Marital status: Single    Alcohol intake: Occasional    Live alone or with others: alone    High cholesterol: Yes    High blood pressure: Yes    Exercise level: None    low level    Chewing tobacco: none    Overweight: No    Obese: No    Diabetes: Yes    General stress level: Low    Diet: Diabetic    Occupation: Retired    Advance directive: No    Caffeine intake: Moderate    1-2 cups of coffee, No soda, occasional Ice tea    Guns present in home: No    Seat belts used routinely: No    Sunscreen used routinely: No    Seat belt/car seat used routinely: Yes    Do you have regular medical check ups:  Yes Do you have regular dental check ups: Yes    Education: 12    Illicit drugs: Denied    Smoke alarm in home: Yes    Salt Intake: Normal Use    Has the Patient had a mammogram to screen for breast cancer within 24 months: No    Would the patient like to schedule a Mammogram: No     Social Determinants of Health     Financial Resource Strain: Low Risk  (12/8/2023)    Overall Financial Resource Strain (CARDIA)    • Difficulty of Paying Living Expenses: Not very hard   Food Insecurity: Not on file   Transportation Needs: No Transportation Needs (12/8/2023)    PRAPARE - Transportation    • Lack of Transportation (Medical): No    • Lack of Transportation (Non-Medical):  No   Physical Activity: Not on file   Stress: Not on file   Social Connections: Not on file   Intimate Partner Violence: Not on file   Housing Stability: Not on file      Medications and Allergies:     Current Outpatient Medications   Medication Sig Dispense Refill   • Alcohol Swabs (DropSafe Alcohol Prep) 70 % PADS USE AS DIRECTED FOUR TIMES DAILY 400 each 5   • aspirin 81 MG tablet Take 1 tablet by mouth daily     • Continuous Blood Gluc  (FreeStyle Daniel 2 [place]) SG Check blood sugars multiple times per day 1 each 0   • Continuous Blood Gluc Sensor (FreeStyle Daniel 2 Sensor) MISC Check blood sugars multiple times per day 6 each 3   • Droplet Pen Needles 31G X 8 MM MISC USE AS DIRECTED FOR INJECTIONS FOUR TIMES DAILY 400 each 10   • escitalopram (LEXAPRO) 20 mg tablet      • glucose blood (Accu-Chek Marya Plus) test strip Use 1 each 4 (four) times a day Use as instructed 120 each 0   • glucose blood (Accu-Chek Marya Plus) test strip TEST BLOOD SUGAR FOUR TIMES DAILY 350 strip 0   • Insulin Syringe-Needle U-100 31G X 5/16" 0.3 ML MISC by Does not apply route     • latanoprost (XALATAN) 0.005 % ophthalmic solution Administer 1 drop to both eyes daily at bedtime 7.5 mL 1   • lisinopril (ZESTRIL) 20 mg tablet TAKE 1 TABLET EVERY DAY 90 tablet 10 • metFORMIN (GLUCOPHAGE-XR) 500 mg 24 hr tablet TAKE 2 TABLETS TWICE A  tablet 0   • metoprolol tartrate (LOPRESSOR) 25 mg tablet TAKE 1 TABLET EVERY 12 HOURS 180 tablet 0   • NovoLOG FlexPen 100 units/mL injection pen INJECT 15 UNITS UNDER THE SKIN THREE TIMES DAILY 45 mL 1   • pravastatin (PRAVACHOL) 10 mg tablet TAKE 1 TABLET EVERY DAY 90 tablet 10   • Toujeo SoloStar 300 units/mL CONCENTRATED U-300 injection pen (1-unit dial) INJECT 40 UNITS SUBCUTANEOUSLY EVERY DAY AT BEDTIME 9 mL 11     No current facility-administered medications for this visit. Allergies   Allergen Reactions   • Penicillins Anaphylaxis     Anaphylaxis  Reaction Date: 28Jun2007;       Immunizations:     Immunization History   Administered Date(s) Administered   • COVID-19 PFIZER VACCINE 0.3 ML IM 04/21/2021   • INFLUENZA 11/20/2014   • Influenza, high dose seasonal 0.7 mL 11/04/2022, 12/08/2023   • Influenza, seasonal, injectable 11/05/2007   • Pneumococcal Conjugate Vaccine 20-valent (Pcv20), Polysace 12/08/2023   • Pneumococcal Polysaccharide PPV23 02/14/2018      Health Maintenance:         Topic Date Due   • Colorectal Cancer Screening  Never done   • Hepatitis C Screening  Completed         Topic Date Due   • COVID-19 Vaccine (2 - Pfizer series) 06/16/2021      Medicare Screening Tests and Risk Assessments:     Maynor Chin is here for his Subsequent Wellness visit. Last Medicare Wellness visit information reviewed, patient interviewed and updates made to the record today. Health Risk Assessment:   Patient rates overall health as good. Patient feels that their physical health rating is same. Patient is satisfied with their life. Eyesight was rated as same. Hearing was rated as same. Patient feels that their emotional and mental health rating is same. Patients states they are never, rarely angry. Patient states they are never, rarely unusually tired/fatigued. Pain experienced in the last 7 days has been none.  Patient states that he has experienced no weight loss or gain in last 6 months. Depression Screening:   PHQ-2 Score: 0      Fall Risk Screening: In the past year, patient has experienced: history of falling in past year    Number of falls: 1  Injured during fall?: No    Feels unsteady when standing or walking?: No    Worried about falling?: No      Home Safety:  Patient does not have trouble with stairs inside or outside of their home. Patient has working smoke alarms and has working carbon monoxide detector. Home safety hazards include: none. Nutrition:   Current diet is Diabetic. Medications:   Patient is currently taking over-the-counter supplements. OTC medications include: see medication list. Patient is able to manage medications. Activities of Daily Living (ADLs)/Instrumental Activities of Daily Living (IADLs):   Walk and transfer into and out of bed and chair?: Yes  Dress and groom yourself?: Yes    Bathe or shower yourself?: Yes    Feed yourself?  Yes  Do your laundry/housekeeping?: Yes  Manage your money, pay your bills and track your expenses?: Yes  Make your own meals?: Yes    Do your own shopping?: Yes    Previous Hospitalizations:   Any hospitalizations or ED visits within the last 12 months?: Yes    How many hospitalizations have you had in the last year?: 1-2    Advance Care Planning:   Living will: No    Durable POA for healthcare: No    Advanced directive: No    Five wishes given: No      Cognitive Screening:   Provider or family/friend/caregiver concerned regarding cognition?: No    PREVENTIVE SCREENINGS      Cardiovascular Screening:    General: Screening Current    Due for: Lipid Panel      Diabetes Screening:     General: Screening Not Indicated and History Diabetes    Due for: Blood Glucose      Colorectal Cancer Screening:       Due for: Cologuard      Prostate Cancer Screening:    General: Screening Current      Osteoporosis Screening:    General: Screening Not Indicated      Abdominal Aortic Aneurysm (AAA) Screening:    Risk factors include: age between 70-75 yo and tobacco use        Lung Cancer Screening:     General: Screening Not Indicated      Hepatitis C Screening:    General: Screening Current    Screening, Brief Intervention, and Referral to Treatment (SBIRT)    Screening  Typical number of drinks in a day: 0  Typical number of drinks in a week: 0  Interpretation: Low risk drinking behavior. Single Item Drug Screening:  How often have you used an illegal drug (including marijuana) or a prescription medication for non-medical reasons in the past year? never    Single Item Drug Screen Score: 0  Interpretation: Negative screen for possible drug use disorder    Brief Intervention  Alcohol & drug use screenings were reviewed. No concerns regarding substance use disorder identified. I have personally reviewed results with the patient. His hepatitis test returned negative. The Vitamin D level, although doubled since the last test, is still slightly low at 26, previously 13. The HbA1c level has increased from 8.7 percent to 9.1 percent. The PSA level is satisfactory at 2.4. All electrolyte levels are within the normal range. Both kidney and liver functions are normal. The protein level is also within the normal range. There are no signs of anemia. The white blood cell counts, and cholesterol levels are good. Physical Exam:     /78 (BP Location: Right arm, Patient Position: Sitting, Cuff Size: Standard)   Pulse 65   Resp 16   Ht 5' 5" (1.651 m)   Wt 64 kg (141 lb)   SpO2 97%   BMI 23.46 kg/m²     Physical Exam  Cardiovascular:      Pulses: no weak pulses          Dorsalis pedis pulses are 2+ on the right side and 2+ on the left side. Feet:      Right foot:      Skin integrity: Callus and dry skin present. No ulcer, skin breakdown, erythema or warmth. Left foot:      Skin integrity: Callus and dry skin present. No ulcer, skin breakdown, erythema or warmth. Patient's shoes and socks removed. Right Foot/Ankle   Right Foot Inspection  Skin Exam: skin normal, skin intact, dry skin, callus and callus. No warmth, no erythema, no maceration, no abnormal color, no pre-ulcer and no ulcer. Toe Exam: ROM and strength within normal limits. Sensory   Monofilament testing: intact    Vascular  Capillary refills: < 3 seconds  The right DP pulse is 2+. Left Foot/Ankle  Left Foot Inspection  Skin Exam: skin normal, skin intact, dry skin and callus. No warmth, no erythema, no maceration, normal color, no pre-ulcer and no ulcer. Toe Exam: ROM and strength within normal limits. Sensory   Monofilament testing: intact    Vascular  Capillary refills: < 3 seconds  The left DP pulse is 2+. Assign Risk Category  No deformity present  No loss of protective sensation  No weak pulses  Risk: 0        Derrick King MD    Transcribed by: Kristina Velasco on 12/08/2023 at 5:20 PM EST.

## 2023-12-11 ENCOUNTER — TELEPHONE (OUTPATIENT)
Dept: FAMILY MEDICINE CLINIC | Facility: CLINIC | Age: 73
End: 2023-12-11

## 2023-12-11 DIAGNOSIS — E10.9 TYPE 1 DIABETES MELLITUS WITHOUT COMPLICATION (HCC): Primary | ICD-10-CM

## 2023-12-11 NOTE — TELEPHONE ENCOUNTER
----- Message from Guanakito Acharya MD sent at 12/11/2023  9:39 AM EST -----  While we got a good picture on 1 eye that looked fine the other eye we really were not able to see so well so we do recommend follow-up with [facility] please give him the phone number to call

## 2024-01-10 ENCOUNTER — TELEPHONE (OUTPATIENT)
Age: 74
End: 2024-01-10

## 2024-01-10 NOTE — TELEPHONE ENCOUNTER
Scheduled date of colonoscopy (as of today): THURSDAY 2/1/2024  Physician performing colonoscopy: Dr.s Jackson  Location of colonoscopy:  [facility]  Bowel prep reviewed with patient: Miralax / Dulcolax with Diabetic instructions

## 2024-01-18 ENCOUNTER — ANESTHESIA (OUTPATIENT)
Dept: ANESTHESIOLOGY | Facility: AMBULATORY SURGERY CENTER | Age: 74
End: 2024-01-18

## 2024-01-18 ENCOUNTER — ANESTHESIA EVENT (OUTPATIENT)
Dept: ANESTHESIOLOGY | Facility: AMBULATORY SURGERY CENTER | Age: 74
End: 2024-01-18

## 2024-01-18 DIAGNOSIS — E11.65 TYPE 2 DIABETES MELLITUS WITH HYPERGLYCEMIA, WITH LONG-TERM CURRENT USE OF INSULIN (HCC): ICD-10-CM

## 2024-01-18 DIAGNOSIS — Z79.4 TYPE 2 DIABETES MELLITUS WITH HYPERGLYCEMIA, WITH LONG-TERM CURRENT USE OF INSULIN (HCC): ICD-10-CM

## 2024-01-18 RX ORDER — METFORMIN HYDROCHLORIDE 500 MG/1
TABLET, EXTENDED RELEASE ORAL
Qty: 360 TABLET | Refills: 1 | Status: SHIPPED | OUTPATIENT
Start: 2024-01-18

## 2024-01-25 ENCOUNTER — TELEPHONE (OUTPATIENT)
Dept: GASTROENTEROLOGY | Facility: CLINIC | Age: 74
End: 2024-01-25

## 2024-01-25 NOTE — TELEPHONE ENCOUNTER
Called pt to confirm procedure, pt confirms, has address. Pt is in progress of finding a . He is unsure if he has his instructions. Urged pt if he could not find it to stop by the office to  the instructions. Pt does not have email or MyChart.

## 2024-01-29 ENCOUNTER — TELEPHONE (OUTPATIENT)
Dept: GASTROENTEROLOGY | Facility: CLINIC | Age: 74
End: 2024-01-29

## 2024-01-29 NOTE — TELEPHONE ENCOUNTER
Pt stopped in stating he never got his prep instructions, although I mailed it on the 11th. It probably got los tin the mail. Gave pt Miralax prep as well as Diabetic sheet with highlighted instructions. As I begin to go over the prep with him, pt expressed he had not been holding the vegetables & fruits with skinds or seeds and things like that. Pt expressed he didn't know as he did not have prep. We rescheduled to next [facility] date and went over prep with him.

## 2024-02-01 DIAGNOSIS — E10.9 TYPE 1 DIABETES MELLITUS WITHOUT COMPLICATION (HCC): ICD-10-CM

## 2024-02-01 RX ORDER — BLOOD SUGAR DIAGNOSTIC
STRIP MISCELLANEOUS
Qty: 350 STRIP | Refills: 1 | Status: SHIPPED | OUTPATIENT
Start: 2024-02-01

## 2024-02-05 ENCOUNTER — VBI (OUTPATIENT)
Dept: ADMINISTRATIVE | Facility: OTHER | Age: 74
End: 2024-02-05

## 2024-03-05 ENCOUNTER — ANESTHESIA EVENT (OUTPATIENT)
Dept: ANESTHESIOLOGY | Facility: AMBULATORY SURGERY CENTER | Age: 74
End: 2024-03-05

## 2024-03-05 ENCOUNTER — ANESTHESIA (OUTPATIENT)
Dept: ANESTHESIOLOGY | Facility: AMBULATORY SURGERY CENTER | Age: 74
End: 2024-03-05

## 2024-03-07 ENCOUNTER — TELEPHONE (OUTPATIENT)
Dept: GASTROENTEROLOGY | Facility: AMBULARY SURGERY CENTER | Age: 74
End: 2024-03-07

## 2024-03-07 NOTE — TELEPHONE ENCOUNTER
----- Message from Mariana Andrew sent at 3/5/2024  7:55 AM EST -----  Regarding: R/S  Please R/S to another facility.  [facility] is oon with this patient's insurance.  Thank you!

## 2024-03-15 ENCOUNTER — OFFICE VISIT (OUTPATIENT)
Dept: FAMILY MEDICINE CLINIC | Facility: CLINIC | Age: 74
End: 2024-03-15
Payer: COMMERCIAL

## 2024-03-15 VITALS
WEIGHT: 135 LBS | RESPIRATION RATE: 16 BRPM | OXYGEN SATURATION: 99 % | HEART RATE: 66 BPM | HEIGHT: 65 IN | SYSTOLIC BLOOD PRESSURE: 124 MMHG | BODY MASS INDEX: 22.49 KG/M2 | DIASTOLIC BLOOD PRESSURE: 76 MMHG

## 2024-03-15 DIAGNOSIS — E10.69 TYPE 1 DIABETES MELLITUS WITH OTHER SPECIFIED COMPLICATION (HCC): Primary | ICD-10-CM

## 2024-03-15 DIAGNOSIS — K43.6 VENTRAL HERNIA WITH OBSTRUCTION AND WITHOUT GANGRENE: ICD-10-CM

## 2024-03-15 DIAGNOSIS — I10 ESSENTIAL HYPERTENSION: ICD-10-CM

## 2024-03-15 DIAGNOSIS — H40.9 GLAUCOMA OF BOTH EYES, UNSPECIFIED GLAUCOMA TYPE: ICD-10-CM

## 2024-03-15 PROCEDURE — G2211 COMPLEX E/M VISIT ADD ON: HCPCS | Performed by: FAMILY MEDICINE

## 2024-03-15 PROCEDURE — 99214 OFFICE O/P EST MOD 30 MIN: CPT | Performed by: FAMILY MEDICINE

## 2024-03-15 RX ORDER — LATANOPROST 50 UG/ML
1 SOLUTION/ DROPS OPHTHALMIC
Qty: 7.5 ML | Refills: 1 | Status: SHIPPED | OUTPATIENT
Start: 2024-03-15

## 2024-03-15 NOTE — H&P (VIEW-ONLY)
"Name: William Ricketts      : 1950      MRN: [id number]  Encounter Provider: Donny Villarreal MD  Encounter Date: 3/15/2024   Encounter department: [facility]    Assessment & Plan     Assessment & Plan  1. Nonreducible hernia.  I will refer him to a general surgeon. If he starts getting a lot of pain, he will go to the ER right away.    Spoke with Dr Bloch who can get him in Monday       2. Medication refill.  His hemoglobin A1c was 9 in 2023. I will refill his eyedrops.    Follow-up  He is scheduled for a colonoscopy on 2024.     Orders Placed This Encounter   Procedures    Ambulatory Referral to General Surgery       Subjective      History of Present Illness  The patient presents for evaluation of a lump on his stomach.  History of diabetes 1 HTN HLD BPH and glaucoma     He noticed a lump on his stomach approximately 2 weeks ago. When he noticed it, it was already a good size. It is not painful. He denies any bleeding. He has normal bowel movements. It feels a little swollen.   He has a family history of cancer.   He has not been to the eye doctor in well over a year. He has a headache.  Review of Systems   Gastrointestinal:         Swelling / mass         Objective     /76 (BP Location: Right arm, Patient Position: Sitting, Cuff Size: Standard)   Pulse 66   Resp 16   Ht 5' 5\" (1.651 m)   Wt 61.2 kg (135 lb)   SpO2 99%   BMI 22.47 kg/m²     Physical Exam    Physical Exam  Abdominal:      Hernia: A hernia is present.             Non reducable   With what feels like fecal contents   Non painful     "

## 2024-03-15 NOTE — PROGRESS NOTES
"Name: William Ricketts      : 1950      MRN: [id number]  Encounter Provider: Donny Vlilarreal MD  Encounter Date: 3/15/2024   Encounter department: [facility]    Assessment & Plan     Assessment & Plan  1. Nonreducible hernia.  I will refer him to a general surgeon. If he starts getting a lot of pain, he will go to the ER right away.    Spoke with Dr Bloch who can get him in Monday       2. Medication refill.  His hemoglobin A1c was 9 in 2023. I will refill his eyedrops.    Follow-up  He is scheduled for a colonoscopy on 2024.     Orders Placed This Encounter   Procedures    Ambulatory Referral to General Surgery       Subjective      History of Present Illness  The patient presents for evaluation of a lump on his stomach.  History of diabetes 1 HTN HLD BPH and glaucoma     He noticed a lump on his stomach approximately 2 weeks ago. When he noticed it, it was already a good size. It is not painful. He denies any bleeding. He has normal bowel movements. It feels a little swollen.   He has a family history of cancer.   He has not been to the eye doctor in well over a year. He has a headache.  Review of Systems   Gastrointestinal:         Swelling / mass         Objective     /76 (BP Location: Right arm, Patient Position: Sitting, Cuff Size: Standard)   Pulse 66   Resp 16   Ht 5' 5\" (1.651 m)   Wt 61.2 kg (135 lb)   SpO2 99%   BMI 22.47 kg/m²     Physical Exam    Physical Exam  Abdominal:      Hernia: A hernia is present.             Non reducable   With what feels like fecal contents   Non painful     "

## 2024-03-15 NOTE — ASSESSMENT & PLAN NOTE
Lab Results   Component Value Date    HGBA1C 9.1 (H) 12/05/2023   -stable, continue same medication. Will evaluate again next visit

## 2024-03-17 PROBLEM — E10.9 TYPE 1 DIABETES MELLITUS WITHOUT COMPLICATION (HCC): Status: RESOLVED | Noted: 2022-11-04 | Resolved: 2024-03-17

## 2024-03-18 ENCOUNTER — CONSULT (OUTPATIENT)
Dept: SURGERY | Facility: CLINIC | Age: 74
End: 2024-03-18
Payer: COMMERCIAL

## 2024-03-18 VITALS
SYSTOLIC BLOOD PRESSURE: 140 MMHG | HEIGHT: 65 IN | DIASTOLIC BLOOD PRESSURE: 82 MMHG | WEIGHT: 137 LBS | HEART RATE: 94 BPM | TEMPERATURE: 97.9 F | OXYGEN SATURATION: 98 % | BODY MASS INDEX: 22.82 KG/M2

## 2024-03-18 DIAGNOSIS — M79.89 FAT NECROSIS: Primary | ICD-10-CM

## 2024-03-18 DIAGNOSIS — K43.6 VENTRAL HERNIA WITH OBSTRUCTION AND WITHOUT GANGRENE: ICD-10-CM

## 2024-03-18 PROCEDURE — 99203 OFFICE O/P NEW LOW 30 MIN: CPT | Performed by: SURGERY

## 2024-03-18 NOTE — PROGRESS NOTES
Patient ID: William Ricketts is a 73 y.o. male Date of Birth 1950       Chief complaint: Abdominal wall mass    Allergies:  Penicillins    Diagnosis:  1. Ventral hernia with obstruction and without gangrene  -     Ambulatory Referral to General Surgery       Diagnosis ICD-10-CM Associated Orders   1. Ventral hernia with obstruction and without gangrene  K43.6 Ambulatory Referral to General Surgery             Subjective:   The patient is a 73-year-old male who has about a 2-week history of a nonpainful mass on the left side of his abdominal wall.  This was felt to represent a hernia.  The patient actually has had a hernia repair but that was right inguinal many years ago and it is recurred.  Of some importance is the fact that he has been taking insulin for good 30 years or so and he gives his injections right where he has the swelling        The following portions of the patient's history were reviewed and updated as appropriate:   Patient Active Problem List   Diagnosis    Takotsubo cardiomyopathy    Essential hypertension    History of cardiac cath    Atherosclerosis of iliac artery    Benign prostatic hyperplasia with nocturia    Type 1 diabetes mellitus with other specified complication (HCC)     Past Medical History:   Diagnosis Date    Anxiety     Arthritis     Depression     Diabetes mellitus (HCC)     Heart attack (HCC)     Heart murmur     Hepatitis     Hyperlipidemia     Palpitations     Peripheral vascular disease (HCC)     Stroke (HCC)      Past Surgical History:   Procedure Laterality Date    HERNIA REPAIR      KNEE SURGERY Right 1977    Tendon     Social History     Socioeconomic History    Marital status: /Civil Union     Spouse name: None    Number of children: None    Years of education: None    Highest education level: None   Occupational History    Occupation: Retired    Tobacco Use    Smoking status: Former     Current packs/day: 1.00     Average packs/day: 1 pack/day for 50.0 years  (50.0 ttl pk-yrs)     Types: Cigarettes    Smokeless tobacco: Never    Tobacco comments:     1 PPW   Substance and Sexual Activity    Alcohol use: Yes    Drug use: None     Comment: Denied    Sexual activity: None   Other Topics Concern    None   Social History Narrative    Most recent tobacco use screenin2020    Do you currently or have you served in the [employer]: No    Were you activated, into active duty, as a member of the [employer] or as a Reservist: No    Marital status: Single    Alcohol intake: Occasional    Live alone or with others: alone    High cholesterol: Yes    High blood pressure: Yes    Exercise level: None    low level    Chewing tobacco: none    Overweight: No    Obese: No    Diabetes: Yes    General stress level: Low    Diet: Diabetic    Occupation: Retired    Advance directive: No    Caffeine intake: Moderate    1-2 cups of coffee, No soda, occasional Ice tea    Guns present in home: No    Seat belts used routinely: No    Sunscreen used routinely: No    Seat belt/car seat used routinely: Yes    Do you have regular medical check ups: Yes    Do you have regular dental check ups: Yes    Education: 12    Illicit drugs: Denied    Smoke alarm in home: Yes    Salt Intake: Normal Use    Has the Patient had a mammogram to screen for breast cancer within 24 months: No    Would the patient like to schedule a Mammogram: No     Social Determinants of Health     Financial Resource Strain: Low Risk  (2023)    Overall Financial Resource Strain (CARDIA)     Difficulty of Paying Living Expenses: Not very hard   Food Insecurity: Not on file   Transportation Needs: No Transportation Needs (2023)    PRAPARE - Transportation     Lack of Transportation (Medical): No     Lack of Transportation (Non-Medical): No   Physical Activity: Not on file   Stress: Not on file   Social Connections: Not on file   Intimate Partner Violence: Not on file   Housing Stability: Not on file        Current  "Outpatient Medications:     Alcohol Swabs (DropSafe Alcohol Prep) 70 % PADS, USE AS DIRECTED FOUR TIMES DAILY, Disp: 400 each, Rfl: 5    aspirin 81 MG tablet, Take 1 tablet by mouth daily, Disp: , Rfl:     Droplet Pen Needles 31G X 8 MM MISC, USE AS DIRECTED FOR INJECTIONS FOUR TIMES DAILY, Disp: 400 each, Rfl: 10    glucose blood (Accu-Chek Marya Plus) test strip, Use 1 each 4 (four) times a day Use as instructed, Disp: 120 each, Rfl: 0    glucose blood (Accu-Chek Marya Plus) test strip, TEST BLOOD SUGAR FOUR TIMES DAILY, Disp: 350 strip, Rfl: 1    Insulin Syringe-Needle U-100 31G X 5/16\" 0.3 ML MISC, by Does not apply route, Disp: , Rfl:     latanoprost (XALATAN) 0.005 % ophthalmic solution, Administer 1 drop to both eyes daily at bedtime, Disp: 7.5 mL, Rfl: 1    lisinopril (ZESTRIL) 20 mg tablet, TAKE 1 TABLET EVERY DAY, Disp: 90 tablet, Rfl: 10    metFORMIN (GLUCOPHAGE-XR) 500 mg 24 hr tablet, TAKE 2 TABLETS TWICE A DAY, Disp: 360 tablet, Rfl: 1    metoprolol tartrate (LOPRESSOR) 25 mg tablet, Take 1 tablet (25 mg total) by mouth every 12 (twelve) hours, Disp: 180 tablet, Rfl: 1    NovoLOG FlexPen 100 units/mL injection pen, INJECT 15 UNITS UNDER THE SKIN THREE TIMES DAILY, Disp: 45 mL, Rfl: 1    pravastatin (PRAVACHOL) 10 mg tablet, TAKE 1 TABLET EVERY DAY, Disp: 90 tablet, Rfl: 10    Toujeo SoloStar 300 units/mL CONCENTRATED U-300 injection pen (1-unit dial), INJECT 40 UNITS SUBCUTANEOUSLY EVERY DAY AT BEDTIME, Disp: 9 mL, Rfl: 11    Continuous Blood Gluc  (FreeStyle Daniel 2 [place]) SG, Check blood sugars multiple times per day (Patient not taking: Reported on 3/18/2024), Disp: 1 each, Rfl: 0    Continuous Blood Gluc Sensor (FreeStyle Daniel 2 Sensor) MISC, Check blood sugars multiple times per day (Patient not taking: Reported on 3/18/2024), Disp: 6 each, Rfl: 3    escitalopram (LEXAPRO) 20 mg tablet, , Disp: , Rfl:   Family History   Problem Relation Age of Onset    Cancer Father         lymphoma, " "brain, lung & liver    Mental illness Father     Depression Father     Depression Sister     Depression Brother     Stroke Maternal Grandfather     Stroke Paternal Grandmother     Diabetes Paternal Grandmother     Heart attack Paternal Grandmother     Depression Brother       Review of Systems   Constitutional:         Wt stable  Never had covid  Had 2 covid shots   HENT: Negative.          Occl rining in ears  Some hearing loss   Eyes:         Wear glasses  glaucoma   Respiratory:          Smoked 14-40 1/2 ppd   Cardiovascular:         Htn  Minor MI    High lipids   Gastrointestinal:         Had hernia surgery at [facility]  Colonoscopy coming   Endocrine:        Type I DM   Genitourinary:         Nocturia x 1   Musculoskeletal:  Positive for arthralgias.   Skin: Negative.    Neurological: Negative.    Hematological:  Does not bruise/bleed easily.   Psychiatric/Behavioral:          Depression         Objective:  /82 (BP Location: Right arm, Patient Position: Sitting, Cuff Size: Standard)   Pulse 94   Temp 97.9 °F (36.6 °C) (Tympanic)   Ht 5' 5\" (1.651 m)   Wt 62.1 kg (137 lb)   SpO2 98%   BMI 22.80 kg/m²     Physical Exam  Vitals reviewed.   Constitutional:       Appearance: Normal appearance.   HENT:      Head: Normocephalic and atraumatic.   Eyes:      General: No scleral icterus.     Conjunctiva/sclera: Conjunctivae normal.   Cardiovascular:      Rate and Rhythm: Normal rate and regular rhythm.      Heart sounds: Normal heart sounds. No murmur heard.     Comments: I think he had a split S2  Pulmonary:      Effort: Pulmonary effort is normal. No respiratory distress.      Breath sounds: Normal breath sounds. No stridor. No wheezing, rhonchi or rales.   Abdominal:      General: Abdomen is flat. A surgical scar is present.      Palpations: Abdomen is soft. There is mass.      Tenderness: There is no guarding or rebound.      Hernia: A hernia is present.          Comments: 8 x 4 cm mass as shown.  This is " "attached to overlying skin.  I think I can get under this which means it is not a hernia.  He does have a recurrent right inguinal hernia.  This mass feels most like a area of necrotic fat although a lipoma or a cyst are also possible   Musculoskeletal:      Cervical back: Normal range of motion.   Lymphadenopathy:      Cervical: No cervical adenopathy.   Neurological:      Mental Status: He is alert.                I offered the patient removal now or told him to come back in a month and see if he got any smaller which it might.  I do not think there is any reason for imaging right now                            Robert Bloch, MD      Portions of the record may have been created with voice recognition software. Occasional wrong word or \"sound a like\" substitutions may have occurred due to the inherent limitations of voice recognition software. Read the chart carefully and recognize, using context, where substitutions have occurred.      "

## 2024-03-27 DIAGNOSIS — Z01.818 ENCOUNTER FOR PREADMISSION TESTING: Primary | ICD-10-CM

## 2024-03-28 ENCOUNTER — APPOINTMENT (OUTPATIENT)
Dept: LAB | Facility: HOSPITAL | Age: 74
End: 2024-03-28
Payer: COMMERCIAL

## 2024-03-28 ENCOUNTER — APPOINTMENT (OUTPATIENT)
Dept: LAB | Facility: HOSPITAL | Age: 74
End: 2024-03-28
Attending: SURGERY
Payer: COMMERCIAL

## 2024-03-28 ENCOUNTER — ANESTHESIA EVENT (OUTPATIENT)
Dept: PERIOP | Facility: HOSPITAL | Age: 74
End: 2024-03-28
Payer: COMMERCIAL

## 2024-03-28 DIAGNOSIS — Z91.89 RISK FACTORS FOR OBSTRUCTIVE SLEEP APNEA: Primary | ICD-10-CM

## 2024-03-28 DIAGNOSIS — Z01.818 ENCOUNTER FOR PREADMISSION TESTING: ICD-10-CM

## 2024-03-28 DIAGNOSIS — E10.69 TYPE 1 DIABETES MELLITUS WITH OTHER SPECIFIED COMPLICATION (HCC): ICD-10-CM

## 2024-03-28 LAB
ALBUMIN SERPL BCP-MCNC: 4.3 G/DL (ref 3.5–5)
ALP SERPL-CCNC: 25 U/L (ref 34–104)
ALT SERPL W P-5'-P-CCNC: 20 U/L (ref 7–52)
ANION GAP SERPL CALCULATED.3IONS-SCNC: 6 MMOL/L (ref 4–13)
AST SERPL W P-5'-P-CCNC: 22 U/L (ref 13–39)
BASOPHILS # BLD AUTO: 0.05 THOUSANDS/ÂΜL (ref 0–0.1)
BASOPHILS NFR BLD AUTO: 1 % (ref 0–1)
BILIRUB SERPL-MCNC: 0.33 MG/DL (ref 0.2–1)
BUN SERPL-MCNC: 14 MG/DL (ref 5–25)
CALCIUM SERPL-MCNC: 9.1 MG/DL (ref 8.4–10.2)
CHLORIDE SERPL-SCNC: 103 MMOL/L (ref 96–108)
CO2 SERPL-SCNC: 30 MMOL/L (ref 21–32)
CREAT SERPL-MCNC: 1.13 MG/DL (ref 0.6–1.3)
EOSINOPHIL # BLD AUTO: 0 THOUSAND/ÂΜL (ref 0–0.61)
EOSINOPHIL NFR BLD AUTO: 0 % (ref 0–6)
ERYTHROCYTE [DISTWIDTH] IN BLOOD BY AUTOMATED COUNT: 14.3 % (ref 11.6–15.1)
EST. AVERAGE GLUCOSE BLD GHB EST-MCNC: 200 MG/DL
GFR SERPL CREATININE-BSD FRML MDRD: 65 ML/MIN/1.73SQ M
GLUCOSE P FAST SERPL-MCNC: 225 MG/DL (ref 65–99)
HBA1C MFR BLD: 8.6 %
HCT VFR BLD AUTO: 39.1 % (ref 36.5–49.3)
HGB BLD-MCNC: 12.6 G/DL (ref 12–17)
IMM GRANULOCYTES # BLD AUTO: 0.03 THOUSAND/UL (ref 0–0.2)
IMM GRANULOCYTES NFR BLD AUTO: 0 % (ref 0–2)
LYMPHOCYTES # BLD AUTO: 2.14 THOUSANDS/ÂΜL (ref 0.6–4.47)
LYMPHOCYTES NFR BLD AUTO: 24 % (ref 14–44)
MCH RBC QN AUTO: 31.1 PG (ref 26.8–34.3)
MCHC RBC AUTO-ENTMCNC: 32.2 G/DL (ref 31.4–37.4)
MCV RBC AUTO: 97 FL (ref 82–98)
MONOCYTES # BLD AUTO: 0.65 THOUSAND/ÂΜL (ref 0.17–1.22)
MONOCYTES NFR BLD AUTO: 7 % (ref 4–12)
NEUTROPHILS # BLD AUTO: 6.2 THOUSANDS/ÂΜL (ref 1.85–7.62)
NEUTS SEG NFR BLD AUTO: 68 % (ref 43–75)
NRBC BLD AUTO-RTO: 0 /100 WBCS
PLATELET # BLD AUTO: 304 THOUSANDS/UL (ref 149–390)
PMV BLD AUTO: 10.6 FL (ref 8.9–12.7)
POTASSIUM SERPL-SCNC: 5.4 MMOL/L (ref 3.5–5.3)
PROT SERPL-MCNC: 7 G/DL (ref 6.4–8.4)
RBC # BLD AUTO: 4.05 MILLION/UL (ref 3.88–5.62)
SODIUM SERPL-SCNC: 139 MMOL/L (ref 135–147)
WBC # BLD AUTO: 9.07 THOUSAND/UL (ref 4.31–10.16)

## 2024-03-28 PROCEDURE — 3052F HG A1C>EQUAL 8.0%<EQUAL 9.0%: CPT | Performed by: SURGERY

## 2024-03-28 PROCEDURE — 83036 HEMOGLOBIN GLYCOSYLATED A1C: CPT

## 2024-03-28 PROCEDURE — 85025 COMPLETE CBC W/AUTO DIFF WBC: CPT

## 2024-03-28 PROCEDURE — 80053 COMPREHEN METABOLIC PANEL: CPT

## 2024-03-28 PROCEDURE — 36415 COLL VENOUS BLD VENIPUNCTURE: CPT

## 2024-03-28 PROCEDURE — 93005 ELECTROCARDIOGRAM TRACING: CPT

## 2024-03-28 NOTE — PRE-PROCEDURE INSTRUCTIONS
Pre-Surgery Instructions:   Medication Instructions    aspirin 81 MG tablet LD 3/28    latanoprost (XALATAN) 0.005 % ophthalmic solution Take night before surgery    lisinopril (ZESTRIL) 20 mg tablet Hold day of surgery.    metFORMIN (GLUCOPHAGE-XR) 500 mg 24 hr tablet Hold day of surgery.    metoprolol tartrate (LOPRESSOR) 25 mg tablet Take day of surgery.    NovoLOG FlexPen 100 units/mL injection pen Hold day of surgery.    pravastatin (PRAVACHOL) 10 mg tablet Take day of surgery.    Toujeo SoloStar 300 units/mL CONCENTRATED U-300 injection pen (1-unit dial) Take night before surgery    VITAMIN D PO Hold day of surgery.    Medication instructions for day surgery reviewed. Please use only a sip of water to take your instructed medications. Avoid all over the counter vitamins, supplements and NSAIDS for one week prior to surgery per anesthesia guidelines. Tylenol is ok to take as needed.     You will receive a call one business day prior to surgery with an arrival time and hospital directions. If your surgery is scheduled on a Monday, the hospital will be calling you on the Friday prior to your surgery. If you have not heard from anyone by 8pm, please call the hospital supervisor through the hospital  at [phone]. ([place] [phone] or [place] [phone]).    Do not eat or drink anything after midnight the night before your surgery, including candy, mints, lifesavers, or chewing gum. Do not drink alcohol 24hrs before your surgery. Try not to smoke at least 24hrs before your surgery.       Follow the pre surgery showering instructions as listed in the “My Surgical Experience Booklet” or otherwise provided by your surgeon's office. Do not use a blade to shave the surgical area 1 week before surgery. It is okay to use a clean electric clippers up to 24 hours before surgery. Do not apply any lotions, creams, including makeup, cologne, deodorant, or perfumes after showering on the day of your surgery.  Do not use dry shampoo, hair spray, hair gel, or any type of hair products.     No contact lenses, eye make-up, or artificial eyelashes. Remove nail polish, including gel polish, and any artificial, gel, or acrylic nails if possible. Remove all jewelry including rings and body piercing jewelry.     Wear causal clothing that is easy to take on and off. Consider your type of surgery.    Keep any valuables, jewelry, piercings at home. Please bring any specially ordered equipment (sling, braces) if indicated.    Arrange for a responsible person to drive you to and from the hospital on the day of your surgery. Please confirm the visitor policy for the day of your procedure when you receive your phone call with an arrival time.     Call the surgeon's office with any new illnesses, exposures, or additional questions prior to surgery.    Please reference your “My Surgical Experience Booklet” for additional information to prepare for your upcoming surgery.

## 2024-03-29 LAB
ATRIAL RATE: 55 BPM
P AXIS: 59 DEGREES
PR INTERVAL: 176 MS
QRS AXIS: -59 DEGREES
QRSD INTERVAL: 88 MS
QT INTERVAL: 460 MS
QTC INTERVAL: 440 MS
T WAVE AXIS: 230 DEGREES
VENTRICULAR RATE: 55 BPM

## 2024-04-01 NOTE — ANESTHESIA PREPROCEDURE EVALUATION
Procedure:  RIGHT INGUINAL HERNIA REPAIR (Right: Groin)  EXCISION MASS ABDOMINAL WALL 8X4CM (Abdomen)    Takotsubo's cardiomyopathy - recovered    A1C 8.6    Relevant Problems   CARDIO   (+) Essential hypertension      ENDO   (+) Type 1 diabetes mellitus with other specified complication (HCC)        Physical Exam    Airway    Mallampati score: II  TM Distance: >3 FB  Neck ROM: full     Dental       Cardiovascular  Rhythm: regular, Rate: normal, Cardiovascular exam normal    Pulmonary  Pulmonary exam normal Breath sounds clear to auscultation    Other Findings        Anesthesia Plan  ASA Score- 3     Anesthesia Type- general with ASA Monitors.         Additional Monitors:     Airway Plan: ETT.    Comment: Risks of general anesthesia discussed including likely possibility of PONV and sore throat, as well as the rare possibilities of aspiration, dental/oropharyngeal/ocular injuries, or grave/life threatening anesthetic and surgical emergencies..       Plan Factors-Exercise tolerance (METS): >4 METS.    Chart reviewed.    Patient summary reviewed.      Patient instructed to abstain from smoking on day of procedure. Patient did not smoke on day of surgery.            Induction- intravenous.    Postoperative Plan- Plan for postoperative opioid use. Planned trial extubation    Informed Consent- Anesthetic plan and risks discussed with patient.  I personally reviewed this patient with the CRNA. Discussed and agreed on the Anesthesia Plan with the CRNA..

## 2024-04-02 ENCOUNTER — ANESTHESIA (OUTPATIENT)
Dept: PERIOP | Facility: HOSPITAL | Age: 74
End: 2024-04-02
Payer: COMMERCIAL

## 2024-04-02 ENCOUNTER — HOSPITAL ENCOUNTER (OUTPATIENT)
Facility: HOSPITAL | Age: 74
Setting detail: OUTPATIENT SURGERY
Discharge: HOME | End: 2024-04-02
Attending: SURGERY | Admitting: SURGERY
Payer: COMMERCIAL

## 2024-04-02 VITALS
RESPIRATION RATE: 17 BRPM | HEIGHT: 65 IN | OXYGEN SATURATION: 96 % | BODY MASS INDEX: 22.66 KG/M2 | HEART RATE: 70 BPM | WEIGHT: 136 LBS | TEMPERATURE: 98.4 F | DIASTOLIC BLOOD PRESSURE: 81 MMHG | SYSTOLIC BLOOD PRESSURE: 145 MMHG

## 2024-04-02 DIAGNOSIS — E10.69 TYPE 1 DIABETES MELLITUS WITH OTHER SPECIFIED COMPLICATION (HCC): ICD-10-CM

## 2024-04-02 DIAGNOSIS — Z98.890 S/P RIGHT INGUINAL HERNIA REPAIR: Primary | ICD-10-CM

## 2024-04-02 DIAGNOSIS — Z87.19 S/P RIGHT INGUINAL HERNIA REPAIR: Primary | ICD-10-CM

## 2024-04-02 DIAGNOSIS — K40.91 RECURRENT INGUINAL HERNIA OF RIGHT SIDE WITHOUT OBSTRUCTION OR GANGRENE: ICD-10-CM

## 2024-04-02 DIAGNOSIS — K65.4 FAT NECROSIS OF ABDOMINAL WALL (HCC): ICD-10-CM

## 2024-04-02 LAB
GLUCOSE SERPL-MCNC: 175 MG/DL (ref 65–140)
GLUCOSE SERPL-MCNC: 184 MG/DL (ref 65–140)

## 2024-04-02 PROCEDURE — 88307 TISSUE EXAM BY PATHOLOGIST: CPT | Performed by: PATHOLOGY

## 2024-04-02 PROCEDURE — 49521 REREPAIR ING HERNIA BLOCKED: CPT | Performed by: SURGERY

## 2024-04-02 PROCEDURE — 11406 EXC TR-EXT B9+MARG >4.0 CM: CPT | Performed by: SURGERY

## 2024-04-02 PROCEDURE — C1781 MESH (IMPLANTABLE): HCPCS | Performed by: SURGERY

## 2024-04-02 PROCEDURE — 11406 EXC TR-EXT B9+MARG >4.0 CM: CPT | Performed by: PHYSICIAN ASSISTANT

## 2024-04-02 PROCEDURE — 49521 REREPAIR ING HERNIA BLOCKED: CPT | Performed by: PHYSICIAN ASSISTANT

## 2024-04-02 PROCEDURE — 82948 REAGENT STRIP/BLOOD GLUCOSE: CPT

## 2024-04-02 PROCEDURE — 12034 INTMD RPR S/TR/EXT 7.6-12.5: CPT | Performed by: PHYSICIAN ASSISTANT

## 2024-04-02 PROCEDURE — 12034 INTMD RPR S/TR/EXT 7.6-12.5: CPT | Performed by: SURGERY

## 2024-04-02 DEVICE — BARD MESH
Type: IMPLANTABLE DEVICE | Site: INGUINAL | Status: FUNCTIONAL
Brand: BARD MESH

## 2024-04-02 RX ORDER — FENTANYL CITRATE 50 UG/ML
INJECTION, SOLUTION INTRAMUSCULAR; INTRAVENOUS AS NEEDED
Status: DISCONTINUED | OUTPATIENT
Start: 2024-04-02 | End: 2024-04-02

## 2024-04-02 RX ORDER — METOPROLOL TARTRATE 1 MG/ML
INJECTION, SOLUTION INTRAVENOUS AS NEEDED
Status: DISCONTINUED | OUTPATIENT
Start: 2024-04-02 | End: 2024-04-02

## 2024-04-02 RX ORDER — ONDANSETRON 2 MG/ML
4 INJECTION INTRAMUSCULAR; INTRAVENOUS ONCE AS NEEDED
Status: DISCONTINUED | OUTPATIENT
Start: 2024-04-02 | End: 2024-04-02 | Stop reason: HOSPADM

## 2024-04-02 RX ORDER — ONDANSETRON 2 MG/ML
INJECTION INTRAMUSCULAR; INTRAVENOUS AS NEEDED
Status: DISCONTINUED | OUTPATIENT
Start: 2024-04-02 | End: 2024-04-02

## 2024-04-02 RX ORDER — HYDROMORPHONE HCL/PF 1 MG/ML
0.5 SYRINGE (ML) INJECTION
Status: DISCONTINUED | OUTPATIENT
Start: 2024-04-02 | End: 2024-04-02 | Stop reason: HOSPADM

## 2024-04-02 RX ORDER — LIDOCAINE HYDROCHLORIDE 10 MG/ML
INJECTION, SOLUTION EPIDURAL; INFILTRATION; INTRACAUDAL; PERINEURAL AS NEEDED
Status: DISCONTINUED | OUTPATIENT
Start: 2024-04-02 | End: 2024-04-02

## 2024-04-02 RX ORDER — SODIUM CHLORIDE, SODIUM LACTATE, POTASSIUM CHLORIDE, CALCIUM CHLORIDE 600; 310; 30; 20 MG/100ML; MG/100ML; MG/100ML; MG/100ML
125 INJECTION, SOLUTION INTRAVENOUS CONTINUOUS
Status: DISCONTINUED | OUTPATIENT
Start: 2024-04-02 | End: 2024-04-02 | Stop reason: HOSPADM

## 2024-04-02 RX ORDER — PROPOFOL 10 MG/ML
INJECTION, EMULSION INTRAVENOUS CONTINUOUS PRN
Status: DISCONTINUED | OUTPATIENT
Start: 2024-04-02 | End: 2024-04-02

## 2024-04-02 RX ORDER — DEXAMETHASONE SODIUM PHOSPHATE 10 MG/ML
INJECTION, SOLUTION INTRAMUSCULAR; INTRAVENOUS AS NEEDED
Status: DISCONTINUED | OUTPATIENT
Start: 2024-04-02 | End: 2024-04-02

## 2024-04-02 RX ORDER — PROPOFOL 10 MG/ML
INJECTION, EMULSION INTRAVENOUS AS NEEDED
Status: DISCONTINUED | OUTPATIENT
Start: 2024-04-02 | End: 2024-04-02

## 2024-04-02 RX ORDER — CEFAZOLIN SODIUM 1 G/3ML
INJECTION, POWDER, FOR SOLUTION INTRAMUSCULAR; INTRAVENOUS AS NEEDED
Status: DISCONTINUED | OUTPATIENT
Start: 2024-04-02 | End: 2024-04-02

## 2024-04-02 RX ORDER — FENTANYL CITRATE/PF 50 MCG/ML
50 SYRINGE (ML) INJECTION
Status: DISCONTINUED | OUTPATIENT
Start: 2024-04-02 | End: 2024-04-02 | Stop reason: HOSPADM

## 2024-04-02 RX ORDER — LABETALOL HYDROCHLORIDE 5 MG/ML
5 INJECTION, SOLUTION INTRAVENOUS
Status: DISCONTINUED | OUTPATIENT
Start: 2024-04-02 | End: 2024-04-02 | Stop reason: HOSPADM

## 2024-04-02 RX ORDER — SODIUM CHLORIDE, SODIUM LACTATE, POTASSIUM CHLORIDE, CALCIUM CHLORIDE 600; 310; 30; 20 MG/100ML; MG/100ML; MG/100ML; MG/100ML
INJECTION, SOLUTION INTRAVENOUS CONTINUOUS PRN
Status: DISCONTINUED | OUTPATIENT
Start: 2024-04-02 | End: 2024-04-02

## 2024-04-02 RX ORDER — ROCURONIUM BROMIDE 10 MG/ML
INJECTION, SOLUTION INTRAVENOUS AS NEEDED
Status: DISCONTINUED | OUTPATIENT
Start: 2024-04-02 | End: 2024-04-02

## 2024-04-02 RX ORDER — DIPHENHYDRAMINE HYDROCHLORIDE 50 MG/ML
12.5 INJECTION INTRAMUSCULAR; INTRAVENOUS ONCE AS NEEDED
Status: DISCONTINUED | OUTPATIENT
Start: 2024-04-02 | End: 2024-04-02 | Stop reason: HOSPADM

## 2024-04-02 RX ORDER — HYDROMORPHONE HCL IN WATER/PF 6 MG/30 ML
0.2 PATIENT CONTROLLED ANALGESIA SYRINGE INTRAVENOUS
Status: DISCONTINUED | OUTPATIENT
Start: 2024-04-02 | End: 2024-04-02 | Stop reason: HOSPADM

## 2024-04-02 RX ORDER — LIDOCAINE HYDROCHLORIDE 10 MG/ML
0.5 INJECTION, SOLUTION EPIDURAL; INFILTRATION; INTRACAUDAL; PERINEURAL ONCE AS NEEDED
Status: DISCONTINUED | OUTPATIENT
Start: 2024-04-02 | End: 2024-04-02 | Stop reason: HOSPADM

## 2024-04-02 RX ORDER — LEVOFLOXACIN 5 MG/ML
750 INJECTION, SOLUTION INTRAVENOUS ONCE
Status: DISCONTINUED | OUTPATIENT
Start: 2024-04-02 | End: 2024-04-02 | Stop reason: HOSPADM

## 2024-04-02 RX ORDER — OXYCODONE HYDROCHLORIDE AND ACETAMINOPHEN 5; 325 MG/1; MG/1
1 TABLET ORAL EVERY 6 HOURS PRN
Status: DISCONTINUED | OUTPATIENT
Start: 2024-04-02 | End: 2024-04-02 | Stop reason: HOSPADM

## 2024-04-02 RX ORDER — OXYCODONE HYDROCHLORIDE AND ACETAMINOPHEN 5; 325 MG/1; MG/1
1 TABLET ORAL EVERY 6 HOURS PRN
Qty: 10 TABLET | Refills: 0 | Status: SHIPPED | OUTPATIENT
Start: 2024-04-02

## 2024-04-02 RX ORDER — METOCLOPRAMIDE HYDROCHLORIDE 5 MG/ML
10 INJECTION INTRAMUSCULAR; INTRAVENOUS ONCE AS NEEDED
Status: DISCONTINUED | OUTPATIENT
Start: 2024-04-02 | End: 2024-04-02 | Stop reason: HOSPADM

## 2024-04-02 RX ORDER — BUPIVACAINE HYDROCHLORIDE AND EPINEPHRINE 2.5; 5 MG/ML; UG/ML
INJECTION, SOLUTION EPIDURAL; INFILTRATION; INTRACAUDAL; PERINEURAL AS NEEDED
Status: DISCONTINUED | OUTPATIENT
Start: 2024-04-02 | End: 2024-04-02 | Stop reason: HOSPADM

## 2024-04-02 RX ADMIN — ROCURONIUM BROMIDE 40 MG: 10 INJECTION, SOLUTION INTRAVENOUS at 10:08

## 2024-04-02 RX ADMIN — DEXAMETHASONE SODIUM PHOSPHATE 5 MG: 10 INJECTION, SOLUTION INTRAMUSCULAR; INTRAVENOUS at 10:24

## 2024-04-02 RX ADMIN — PROPOFOL 20 MG: 10 INJECTION, EMULSION INTRAVENOUS at 11:17

## 2024-04-02 RX ADMIN — PROPOFOL 50 MG: 10 INJECTION, EMULSION INTRAVENOUS at 10:08

## 2024-04-02 RX ADMIN — SODIUM CHLORIDE, SODIUM LACTATE, POTASSIUM CHLORIDE, AND CALCIUM CHLORIDE: .6; .31; .03; .02 INJECTION, SOLUTION INTRAVENOUS at 10:04

## 2024-04-02 RX ADMIN — PROPOFOL 20 MG: 10 INJECTION, EMULSION INTRAVENOUS at 11:20

## 2024-04-02 RX ADMIN — CEFAZOLIN 900 MG: 1 INJECTION, POWDER, FOR SOLUTION INTRAMUSCULAR; INTRAVENOUS at 10:10

## 2024-04-02 RX ADMIN — CEFAZOLIN 100 MG: 1 INJECTION, POWDER, FOR SOLUTION INTRAMUSCULAR; INTRAVENOUS at 10:04

## 2024-04-02 RX ADMIN — SUGAMMADEX 200 MG: 100 INJECTION, SOLUTION INTRAVENOUS at 11:41

## 2024-04-02 RX ADMIN — SODIUM CHLORIDE, SODIUM LACTATE, POTASSIUM CHLORIDE, AND CALCIUM CHLORIDE 125 ML/HR: .6; .31; .03; .02 INJECTION, SOLUTION INTRAVENOUS at 09:51

## 2024-04-02 RX ADMIN — PROPOFOL 20 MG: 10 INJECTION, EMULSION INTRAVENOUS at 11:23

## 2024-04-02 RX ADMIN — METOPROLOL TARTRATE 1.5 MG: 5 INJECTION INTRAVENOUS at 10:35

## 2024-04-02 RX ADMIN — PROPOFOL 50 MCG/KG/MIN: 10 INJECTION, EMULSION INTRAVENOUS at 11:15

## 2024-04-02 RX ADMIN — PROPOFOL 100 MG: 10 INJECTION, EMULSION INTRAVENOUS at 10:07

## 2024-04-02 RX ADMIN — FENTANYL CITRATE 25 MCG: 50 INJECTION INTRAMUSCULAR; INTRAVENOUS at 11:18

## 2024-04-02 RX ADMIN — LIDOCAINE HYDROCHLORIDE 50 MG: 10 INJECTION, SOLUTION EPIDURAL; INFILTRATION; INTRACAUDAL at 10:07

## 2024-04-02 RX ADMIN — FENTANYL CITRATE 50 MCG: 50 INJECTION INTRAMUSCULAR; INTRAVENOUS at 10:07

## 2024-04-02 RX ADMIN — METOPROLOL TARTRATE 1 MG: 5 INJECTION INTRAVENOUS at 10:20

## 2024-04-02 RX ADMIN — FENTANYL CITRATE 50 MCG: 50 INJECTION INTRAMUSCULAR; INTRAVENOUS at 10:37

## 2024-04-02 RX ADMIN — ONDANSETRON 4 MG: 2 INJECTION INTRAMUSCULAR; INTRAVENOUS at 11:15

## 2024-04-02 NOTE — DISCHARGE INSTR - AVS FIRST PAGE
DISCHARGE INSTRUCTIONS:  FOLLOW UP APPOINTMENT: Following discharge from the hospital call the office in 1-2 days to set up a post operative appointment to be seen in 2 weeks by Dr. Bloch .    AFTER YOU LEAVE: Following discharge from the hospital, you may have some questions about your procedure, your activities or your general condition.  These instructions may answer some of your questions and help you adjust during the first few days following your operation.  You can expect to be sore and tender mostly around the incisions. This pain can last approximally 5 days and should gradually improve daily.          INCISION SITES:  - You may apply ice to the incisions to help with pain. Avoid heat as this may make skin glue tacky.  - It is normal to have some bruising, swelling or mild discoloration around the incision.  If increasing redness, pain, or thick green/yellow discharge develops, call our office immediately.   -Do not apply any creams, lotions, or ointments (including neosporin).  If you have dressings:  - You may remove the gauze dressing from your incisions 48 hours after surgery. Underneath this dressing is a tape like dressing called Steri Strips. Leave the steri strips in place for 5-7 days. They may fall off on their own, this is ok.  If you have surgical adhesive glue:  -The incisions are closed with dissolvable sutures underneath the skin and a surgical adhesive glue overlying the skin.  - Do not pick at the adhesive. It will naturally wear off with time.  -Do not place a heat to the incisions as this can make the glue tacky.    WOUND CARE:  -If you have steri-strip, leave them on, allow to fall off naturally.    -Avoid tight adhering, irritative clothing.  -You may gently shower, let water and soap run down and pat dry; no scrubbing the incision sites.   -No submersion in water (baths, hot tubs, or swimming) until cleared at follow up appointment.    PAIN CONTROL/MEDICATIONS:  -Recommend taking over  the counter pain medication such as acetaminophen (Tylenol), Aleve, OR ibuprofen (Motrin/Advil) first; read and follow labels. You may alternate Tylenol and Ibuprofen every 3 hours.  -Only use prescribed pain medications as needed and try to taper down use overtime. Do not drive or operate heavy machinery while on prescription pain medications. Do not take with alcohol.  - If you were given a prescription for Percocet, Norco, or Vicodin for pain be sure to eat prior to taking as these medications as they may cause nausea and vomiting on an empty stomach.    -DO NOT take Tylenol  (acetaminophen) with prescribed pain medication for a fever or for further pain control as these medications already contain Tylenol in them. Take one or the other.  Do not exceed more than 4000 mg of acetaminophen in 24 hours or 3000 mg if you have liver disease.   -You may apply ice at the incision site as needed for 20 minutes on/off to decrease pain/swelling the first few days.   - If you were given an antibiotic take it until it is finished.    DIET/LIFE HABITS:  -Advance diet as tolerated. Start with bland foods. Once tolerating normal diet, include fiber-rich foods such as fruits and green leafy vegetables to help with bowel movements.  -Stay hydrated. Drink plenty of non-caffienated, non-alcoholic beverages such as water, juices, popsicles, etc.  -Refrain from alcohol consumption the first few weeks as this can impair wound healing and increase the risk of unwanted bleeding.   -No smoking at least 2 weeks post surgery, this can also delay wound healing. Smoking cessation is encouraged to improve your overall health. We can provide you with options to facilitate cessation.  -If you are having constipation, ensure you are eating a high fiber diet, stay active, and if requiring more, you may take over the counter stool softners as needed.    ACTIVITY/RESTRICTIONS:  -No strenuous exercise and no heavy lifting, pulling, or pushing >10-15  lbs x 4 weeks or until cleared by surgeon.  -The evening following the procedure you should rest as much as possible, sitting, lying or reclining.  You should be sure someone remains with you until the next morning.  Gradually increase your activity daily. Walking 3-4 times daily is good and stairs are ok.  Listen to your body.  If you start to get tired or sore then rest.   -No driving for 5 days or while taking narcotics for pain. You may begin to drive again once you can get in and out of a car comfortably and once off prescribed pain medication x 24 hours.       RETURN TO WORK:  -You may return to work or other activities as soon as your pain is controlled and you feel comfortable. For many people, this is 5 to 7 days after surgery. If your job requires heavy lifting you will need to be on light duty for 2-3 weeks.     CALL THE OFFICE IF/RETURN TO ED:  -Fevers/chills with uncontrolled temperature > 100.4 F.  -Increasing severe pain uncontrolled with pain medicine.  -Incision site is having thick, yellow drainage, increasing redness, is warm to the touch, or becoming increasingly tender.  -Any changes in overall rodríguez such as: nausea/vomitting, fevers/chills, diarrhea/constipation, inability to urinate, chest pains, palpations, trouble breathing, coughing, etc.

## 2024-04-02 NOTE — INTERVAL H&P NOTE
H&P reviewed. After examining the patient I find no changes in the patients condition since the H&P had been written.  Patient did call back a day later stating he wishes to have the mass excised and the hernia repaired    There were no vitals filed for this visit.

## 2024-04-02 NOTE — OP NOTE
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]    Procedure(s):  Right - RIGHT INGUINAL HERNIA REPAIR with mesh  EXCISION MASS ABDOMINAL WALL 8X4CM    Specimen(s):  ID Type Source Tests Collected by Time Destination   1 : Mass of anterior abdominal wall Tissue Soft Tissue, Other TISSUE EXAM Robert Bloch, MD 2024 11:17 AM        Estimated Blood Loss:   Minimal    Drains:  * No LDAs found *    Anesthesia Type:   General    Operative Indications:  Recurrent inguinal hernia of right side without obstruction or gangrene [K40.91]  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  Patient's abdomen was shaved and prepped and draped in a normal surgical manner and a timeout was done.  It was decided to do the hernia repair first.  The scar from the old hernia repair was old Bleich and I decided to use it.  Local anesthesia was infiltrated and then using a knife followed by a Bovie I made the a Bleich incision.  This was carried down to the external Bleich which appeared to be closed with either silk or Ethibond sutures.  This was nicked and opened all the way through the internal ring going superiorly and past the pubic tubercle going inferior medial.  I was able to easily dissect  the cord and pull it out of the way.  The hernia was a direct hernia and it was attached to the lower cord.  This was dissected free and then I dissected the hernia away from all of its attachments.  I had to remove one of the silk or Ethibond sutures to be able to reduce the hernia.  I reduced the hernia and then closed the defect in the floor with running Prolene.  I then did a Romeo repair by cutting a mesh to size and sewing it in place inferiorly to the pubic tubercle and then laterally to conjoined tendon and medially to Poupart's ligament.  The ends of the mesh were cut and then sewn to each other.  Wounds irrigated and then the external was closed with running Vicryl.  Interrupted Vicryl was placed to close the subcu followed by Monocryl and Exofin.  I now paid attention to the mass on the anterior abdominal wall.  This was marked out and then I injected local a centimeter lateral circumferentially and also over the ends of the mass.  Elliptical skin incision is made with a knife and continued with the Bovie as I raised flaps.  I now dissected tissue free from this mass without entering the mass.  The mass was completely excised and wounds were now irrigated and bleeding points Heema coagulated.  Closure was with 2 layers of Vicryl followed by Monocryl followed by Exofin.  Size of the mass: 9.5 x 5.5 x 4 cm  Size of the incision: 8.4 cm; closure intermediate    There was no qualified resident to assist.  Accordingly, ANJUM Whitfield was the first assistant   I was present for all critical portions of the procedure. and A physician assistant was required during the procedure for retraction, tissue handling, dissection and suturing.    Patient Disposition:  PACU         SIGNATURE: Robert Bloch, MD  DATE: April 2, 2024  TIME: 11:30 AM

## 2024-04-02 NOTE — ANESTHESIA POSTPROCEDURE EVALUATION
Post-Op Assessment Note    CV Status:  Stable  Pain Score: 0    Pain management: adequate       Mental Status:  Awake and sleepy   Hydration Status:  Stable   PONV Controlled:  None   Airway Patency:  Patent  Airway: intubated  Two or more mitigation strategies used for obstructive sleep apnea. There is a medical reason for not screening for obstructive sleep apnea and/or for not using two or more mitigation strategies   Post Op Vitals Reviewed: Yes    No anethesia notable event occurred.    Staff: Anesthesiologist               BP   148/79   Temp 97   Pulse 69   Resp 16   SpO2 100

## 2024-04-05 ENCOUNTER — APPOINTMENT (EMERGENCY)
Dept: CT IMAGING | Facility: HOSPITAL | Age: 74
End: 2024-04-05
Payer: COMMERCIAL

## 2024-04-05 ENCOUNTER — HOSPITAL ENCOUNTER (EMERGENCY)
Facility: HOSPITAL | Age: 74
Discharge: HOME | End: 2024-04-05
Attending: EMERGENCY MEDICINE
Payer: COMMERCIAL

## 2024-04-05 VITALS
OXYGEN SATURATION: 96 % | DIASTOLIC BLOOD PRESSURE: 87 MMHG | TEMPERATURE: 98.5 F | SYSTOLIC BLOOD PRESSURE: 166 MMHG | RESPIRATION RATE: 19 BRPM | HEART RATE: 79 BPM

## 2024-04-05 DIAGNOSIS — S00.81XA ABRASION OF FOREHEAD, INITIAL ENCOUNTER: ICD-10-CM
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
## 2024-04-05 DIAGNOSIS — S09.90XA INJURY OF HEAD, INITIAL ENCOUNTER: ICD-10-CM

## 2024-04-05 DIAGNOSIS — E83.42 HYPOMAGNESEMIA: ICD-10-CM

## 2024-04-05 DIAGNOSIS — Z86.39 HISTORY OF DIABETES MELLITUS: ICD-10-CM

## 2024-04-05 LAB
ALBUMIN SERPL BCP-MCNC: 4.4 G/DL (ref 3.5–5)
ALP SERPL-CCNC: 25 U/L (ref 34–104)
ALT SERPL W P-5'-P-CCNC: 20 U/L (ref 7–52)
ANION GAP SERPL CALCULATED.3IONS-SCNC: 10 MMOL/L (ref 4–13)
APTT PPP: 26 SECONDS (ref 23–37)
AST SERPL W P-5'-P-CCNC: 34 U/L (ref 13–39)
BASOPHILS # BLD AUTO: 0.03 THOUSANDS/ÂΜL (ref 0–0.1)
BASOPHILS NFR BLD AUTO: 0 % (ref 0–1)
BILIRUB SERPL-MCNC: 0.69 MG/DL (ref 0.2–1)
BUN SERPL-MCNC: 23 MG/DL (ref 5–25)
CALCIUM SERPL-MCNC: 9 MG/DL (ref 8.4–10.2)
CHLORIDE SERPL-SCNC: 100 MMOL/L (ref 96–108)
CO2 SERPL-SCNC: 28 MMOL/L (ref 21–32)
CREAT SERPL-MCNC: 0.98 MG/DL (ref 0.6–1.3)
EOSINOPHIL # BLD AUTO: 0 THOUSAND/ÂΜL (ref 0–0.61)
EOSINOPHIL NFR BLD AUTO: 0 % (ref 0–6)
ERYTHROCYTE [DISTWIDTH] IN BLOOD BY AUTOMATED COUNT: 14.2 % (ref 11.6–15.1)
GFR SERPL CREATININE-BSD FRML MDRD: 77 ML/MIN/1.73SQ M
GLUCOSE SERPL-MCNC: 153 MG/DL (ref 65–140)
GLUCOSE SERPL-MCNC: 171 MG/DL (ref 65–140)
HCT VFR BLD AUTO: 36 % (ref 36.5–49.3)
HGB BLD-MCNC: 12 G/DL (ref 12–17)
IMM GRANULOCYTES # BLD AUTO: 0.06 THOUSAND/UL (ref 0–0.2)
IMM GRANULOCYTES NFR BLD AUTO: 1 % (ref 0–2)
INR PPP: 1.05 (ref 0.84–1.19)
LYMPHOCYTES # BLD AUTO: 1.45 THOUSANDS/ÂΜL (ref 0.6–4.47)
LYMPHOCYTES NFR BLD AUTO: 11 % (ref 14–44)
MAGNESIUM SERPL-MCNC: 1.6 MG/DL (ref 1.9–2.7)
MCH RBC QN AUTO: 31.7 PG (ref 26.8–34.3)
MCHC RBC AUTO-ENTMCNC: 33.3 G/DL (ref 31.4–37.4)
MCV RBC AUTO: 95 FL (ref 82–98)
MONOCYTES # BLD AUTO: 1.16 THOUSAND/ÂΜL (ref 0.17–1.22)
MONOCYTES NFR BLD AUTO: 9 % (ref 4–12)
NEUTROPHILS # BLD AUTO: 10.31 THOUSANDS/ÂΜL (ref 1.85–7.62)
NEUTS SEG NFR BLD AUTO: 79 % (ref 43–75)
NRBC BLD AUTO-RTO: 0 /100 WBCS
PLATELET # BLD AUTO: 271 THOUSANDS/UL (ref 149–390)
PMV BLD AUTO: 10.5 FL (ref 8.9–12.7)
POTASSIUM SERPL-SCNC: 3.9 MMOL/L (ref 3.5–5.3)
PROT SERPL-MCNC: 7.1 G/DL (ref 6.4–8.4)
PROTHROMBIN TIME: 13.7 SECONDS (ref 11.6–14.5)
RBC # BLD AUTO: 3.78 MILLION/UL (ref 3.88–5.62)
SODIUM SERPL-SCNC: 138 MMOL/L (ref 135–147)
WBC # BLD AUTO: 13.01 THOUSAND/UL (ref 4.31–10.16)

## 2024-04-05 PROCEDURE — 85730 THROMBOPLASTIN TIME PARTIAL: CPT | Performed by: EMERGENCY MEDICINE

## 2024-04-05 PROCEDURE — 85025 COMPLETE CBC W/AUTO DIFF WBC: CPT | Performed by: EMERGENCY MEDICINE

## 2024-04-05 PROCEDURE — 90715 TDAP VACCINE 7 YRS/> IM: CPT | Performed by: EMERGENCY MEDICINE

## 2024-04-05 PROCEDURE — 85610 PROTHROMBIN TIME: CPT | Performed by: EMERGENCY MEDICINE

## 2024-04-05 PROCEDURE — 80053 COMPREHEN METABOLIC PANEL: CPT | Performed by: EMERGENCY MEDICINE

## 2024-04-05 PROCEDURE — 96360 HYDRATION IV INFUSION INIT: CPT

## 2024-04-05 PROCEDURE — 36415 COLL VENOUS BLD VENIPUNCTURE: CPT | Performed by: EMERGENCY MEDICINE

## 2024-04-05 PROCEDURE — 70450 CT HEAD/BRAIN W/O DYE: CPT

## 2024-04-05 PROCEDURE — 83735 ASSAY OF MAGNESIUM: CPT | Performed by: EMERGENCY MEDICINE

## 2024-04-05 PROCEDURE — 90471 IMMUNIZATION ADMIN: CPT

## 2024-04-05 PROCEDURE — 99284 EMERGENCY DEPT VISIT MOD MDM: CPT

## 2024-04-05 PROCEDURE — 93005 ELECTROCARDIOGRAM TRACING: CPT

## 2024-04-05 PROCEDURE — 72125 CT NECK SPINE W/O DYE: CPT

## 2024-04-05 PROCEDURE — 82948 REAGENT STRIP/BLOOD GLUCOSE: CPT

## 2024-04-05 RX ORDER — LANOLIN ALCOHOL/MO/W.PET/CERES
800 CREAM (GRAM) TOPICAL ONCE
Status: COMPLETED | OUTPATIENT
Start: 2024-04-05 | End: 2024-04-05

## 2024-04-05 RX ORDER — GINSENG 100 MG
1 CAPSULE ORAL ONCE
Status: COMPLETED | OUTPATIENT
Start: 2024-04-05 | End: 2024-04-05

## 2024-04-05 RX ADMIN — Medication 800 MG: at 21:12

## 2024-04-05 RX ADMIN — TETANUS TOXOID, REDUCED DIPHTHERIA TOXOID AND ACELLULAR PERTUSSIS VACCINE, ADSORBED 0.5 ML: 5; 2.5; 8; 8; 2.5 SUSPENSION INTRAMUSCULAR at 20:49

## 2024-04-05 RX ADMIN — BACITRACIN ZINC 1 SMALL APPLICATION: 500 OINTMENT TOPICAL at 20:51

## 2024-04-05 RX ADMIN — SODIUM CHLORIDE 500 ML: 0.9 INJECTION, SOLUTION INTRAVENOUS at 20:26

## 2024-04-05 NOTE — ED PROVIDER NOTES
"History  Chief Complaint   Patient presents with    Fall     Pt presents to the ED after a fall last night. Pt states that he got dizzy and then blacked out. Pt is c/o head and neck pain. Pt takes aspirin 81mg     Patient is a 73-year-old male seen in the emergency department with concern for fall last night with head injury and brief loss of consciousness.  Patient's symptoms might be due to hypoglycemic episode at that time.  Patient explains that he felt dizzy prior to the episode.  Patient states that he struck his head.  Patient notes headache and neck pain after the injury.  Patient also notes history of hernia surgery and surgery to remove a mass in his groin on Tuesday of this week.  Patient notes some soreness to bilateral inguinal regions.  Patient states that he takes a baby aspirin daily at home.  Patient notes no fever, chest pain, shortness of breath, abdominal pain, nausea, vomiting, diarrhea, weakness, numbness, tingling.  Patient states that he is not sure of his last tetanus shot.        Prior to Admission Medications   Prescriptions Last Dose Informant Patient Reported? Taking?   Alcohol Swabs (DropSafe Alcohol Prep) 70 % PADS   No No   Sig: USE AS DIRECTED FOUR TIMES DAILY   Continuous Blood Gluc  (FreeStyle Daniel 2 [place]mond) SG   No No   Sig: Check blood sugars multiple times per day   Patient not taking: Reported on 3/18/2024   Continuous Blood Gluc Sensor (FreeStyle Daniel 2 Sensor) MISC   No No   Sig: Check blood sugars multiple times per day   Patient not taking: Reported on 3/18/2024   Droplet Pen Needles 31G X 8 MM MISC   No No   Sig: USE AS DIRECTED FOR INJECTIONS FOUR TIMES DAILY   Insulin Syringe-Needle U-100 31G X 5/16\" 0.3 ML MISC  Self Yes No   Sig: by Does not apply route   NovoLOG FlexPen 100 units/mL injection pen   No No   Sig: INJECT 15 UNITS UNDER THE SKIN THREE TIMES DAILY   Touvalery SoloStar 300 units/mL CONCENTRATED U-300 injection pen (1-unit dial)   No No   Sig: " INJECT 40 UNITS SUBCUTANEOUSLY EVERY DAY AT BEDTIME   VITAMIN D PO   Yes No   Sig: Take 10,000 Int'l Units by mouth in the morning   aspirin 81 MG tablet  Self Yes No   Sig: Take 1 tablet by mouth daily   escitalopram (LEXAPRO) 20 mg tablet   Yes No   glucose blood (Accu-Chek Marya Plus) test strip   No No   Sig: Use 1 each 4 (four) times a day Use as instructed   glucose blood (Accu-Chek Marya Plus) test strip   No No   Sig: TEST BLOOD SUGAR FOUR TIMES DAILY   latanoprost (XALATAN) 0.005 % ophthalmic solution   No No   Sig: Administer 1 drop to both eyes daily at bedtime   lisinopril (ZESTRIL) 20 mg tablet   No No   Sig: TAKE 1 TABLET EVERY DAY   metFORMIN (GLUCOPHAGE-XR) 500 mg 24 hr tablet   No No   Sig: TAKE 2 TABLETS TWICE A DAY   metoprolol tartrate (LOPRESSOR) 25 mg tablet   No No   Sig: Take 1 tablet (25 mg total) by mouth every 12 (twelve) hours   oxyCODONE-acetaminophen (Percocet) 5-325 mg per tablet   No No   Sig: Take 1 tablet by mouth every 6 (six) hours as needed for severe pain for up to 10 doses Max Daily Amount: 4 tablets   pravastatin (PRAVACHOL) 10 mg tablet   No No   Sig: TAKE 1 TABLET EVERY DAY      Facility-Administered Medications: None       Past Medical History:   Diagnosis Date    Anxiety     Arthritis     BPH associated with nocturia     Depression     Diabetes mellitus (HCC)     Glaucoma     Heart murmur     Hepatitis     Hyperlipidemia     Myocardial infarction (HCC)     Palpitations     Peripheral vascular disease (HCC)     Stroke (HCC)        Past Surgical History:   Procedure Laterality Date    HERNIA REPAIR      KNEE SURGERY Right 1977    Tendon    LA EXC TUMOR SOFT TISSUE ABDOMINAL WALL SUBQ 3 CM/> N/A 4/2/2024    Procedure: EXCISION MASS ABDOMINAL WALL 8X4CM;  Surgeon: Robert Bloch, MD;  Location:  MAIN OR;  Service: General    LA RPR RECRT INGUINAL HERNIA ANY AGE REDUCIBLE Right 4/2/2024    Procedure: RIGHT INGUINAL HERNIA REPAIR with mesh;  Surgeon: Robert Bloch, MD;  Location:  [facility];  Service: General       Family History   Problem Relation Age of Onset    Cancer Father         lymphoma, brain, lung & liver    Mental illness Father     Depression Father     Depression Sister     Depression Brother     Stroke Maternal Grandfather     Stroke Paternal Grandmother     Diabetes Paternal Grandmother     Heart attack Paternal Grandmother     Depression Brother      I have reviewed and agree with the history as documented.    E-Cigarette/Vaping    E-Cigarette Use Never User      E-Cigarette/Vaping Substances     Social History     Tobacco Use    Smoking status: Former     Current packs/day: 0.00     Average packs/day: 0.5 packs/day for 42.0 years (21.0 ttl pk-yrs)     Types: Cigarettes     Start date:      Quit date:      Years since quittin.2    Smokeless tobacco: Never   Vaping Use    Vaping status: Never Used   Substance Use Topics    Alcohol use: Not Currently    Drug use: Not Currently     Types: Marijuana       Review of Systems   Constitutional:  Negative for chills and fever.   HENT:  Negative for ear pain and sore throat.    Eyes:  Negative for pain and visual disturbance.   Respiratory:  Negative for cough and shortness of breath.    Cardiovascular:  Negative for chest pain and palpitations.   Gastrointestinal:  Negative for abdominal pain and vomiting.   Genitourinary:  Negative for decreased urine volume and difficulty urinating.   Musculoskeletal:  Positive for neck pain. Negative for back pain.   Skin:  Negative for color change and rash.   Neurological:  Positive for syncope and headaches. Negative for weakness.   Psychiatric/Behavioral:  Negative for agitation and confusion.    All other systems reviewed and are negative.      Physical Exam  Physical Exam  Vitals and nursing note reviewed.   Constitutional:       General: He is not in acute distress.     Appearance: He is well-developed.   HENT:      Head: Normocephalic.      Comments: Frontal abrasion     Right  Ear: External ear normal.      Left Ear: External ear normal.      Nose: Nose normal.      Mouth/Throat:      Pharynx: Oropharynx is clear.   Eyes:      General: No scleral icterus.     Conjunctiva/sclera: Conjunctivae normal.   Neck:      Comments: Midline C-spine tenderness, no step-offs noted  Cardiovascular:      Rate and Rhythm: Normal rate and regular rhythm.      Heart sounds: No murmur heard.  Pulmonary:      Effort: Pulmonary effort is normal. No respiratory distress.      Breath sounds: Normal breath sounds.   Abdominal:      General: There is no distension.      Palpations: Abdomen is soft.      Tenderness: There is no abdominal tenderness.      Comments: Bilateral lower abdominal/inguinal incisions are clean/dry/intact; bruising over bilateral inguinal regions   Musculoskeletal:         General: No swelling.      Cervical back: Tenderness present. No rigidity.   Skin:     General: Skin is warm and dry.   Neurological:      General: No focal deficit present.      Mental Status: He is alert.      Cranial Nerves: No cranial nerve deficit.      Sensory: No sensory deficit.   Psychiatric:         Mood and Affect: Mood normal.         Thought Content: Thought content normal.         Vital Signs  ED Triage Vitals [04/05/24 2008]   Temperature Pulse Respirations Blood Pressure SpO2   98.9 °F (37.2 °C) 79 16 121/86 98 %      Temp Source Heart Rate Source Patient Position - Orthostatic VS BP Location FiO2 (%)   Oral Monitor Sitting Left arm --      Pain Score       --           Vitals:    04/05/24 2008 04/05/24 2030   BP: 121/86 169/82   Pulse: 79 71   Patient Position - Orthostatic VS: Sitting          Visual Acuity      ED Medications  Medications   sodium chloride 0.9 % bolus 500 mL (0 mL Intravenous Stopped 4/5/24 2113)   tetanus-diphtheria-acellular pertussis (BOOSTRIX) IM injection 0.5 mL (0.5 mL Intramuscular Given 4/5/24 2049)   bacitracin topical ointment 1 small application (1 small application Topical  Given 4/5/24 2051)   magnesium Oxide (MAG-OX) tablet 800 mg (800 mg Oral Given 4/5/24 2112)       Diagnostic Studies  Results Reviewed       Procedure Component Value Units Date/Time    Comprehensive metabolic panel [[id number]]  (Abnormal) Collected: 04/05/24 2026    Lab Status: Final result Specimen: Blood from Arm, Right Updated: 04/05/24 2052     Sodium 138 mmol/L      Potassium 3.9 mmol/L      Chloride 100 mmol/L      CO2 28 mmol/L      ANION GAP 10 mmol/L      BUN 23 mg/dL      Creatinine 0.98 mg/dL      Glucose 153 mg/dL      Calcium 9.0 mg/dL      AST 34 U/L      ALT 20 U/L      Alkaline Phosphatase 25 U/L      Total Protein 7.1 g/dL      Albumin 4.4 g/dL      Total Bilirubin 0.69 mg/dL      eGFR 77 ml/min/1.73sq m     Narrative:      National Kidney Disease Foundation guidelines for Chronic Kidney Disease (CKD):     Stage 1 with normal or high GFR (GFR > 90 mL/min/1.73 square meters)    Stage 2 Mild CKD (GFR = 60-89 mL/min/1.73 square meters)    Stage 3A Moderate CKD (GFR = 45-59 mL/min/1.73 square meters)    Stage 3B Moderate CKD (GFR = 30-44 mL/min/1.73 square meters)    Stage 4 Severe CKD (GFR = 15-29 mL/min/1.73 square meters)    Stage 5 End Stage CKD (GFR <15 mL/min/1.73 square meters)  Note: GFR calculation is accurate only with a steady state creatinine    Magnesium [[id number]]  (Abnormal) Collected: 04/05/24 2026    Lab Status: Final result Specimen: Blood from Arm, Right Updated: 04/05/24 2052     Magnesium 1.6 mg/dL     APTT [[id number]]  (Normal) Collected: 04/05/24 2026    Lab Status: Final result Specimen: Blood from Arm, Right Updated: 04/05/24 2044     PTT 26 seconds     Protime-INR [[id number]]  (Normal) Collected: 04/05/24 2026    Lab Status: Final result Specimen: Blood from Arm, Right Updated: 04/05/24 2044     Protime 13.7 seconds      INR 1.05    CBC and differential [[id number]]  (Abnormal) Collected: 04/05/24 2026    Lab Status: Final result Specimen: Blood from Arm, Right Updated:  04/05/24 2034     WBC 13.01 Thousand/uL      RBC 3.78 Million/uL      Hemoglobin 12.0 g/dL      Hematocrit 36.0 %      MCV 95 fL      MCH 31.7 pg      MCHC 33.3 g/dL      RDW 14.2 %      MPV 10.5 fL      Platelets 271 Thousands/uL      nRBC 0 /100 WBCs      Neutrophils Relative 79 %      Immature Grans % 1 %      Lymphocytes Relative 11 %      Monocytes Relative 9 %      Eosinophils Relative 0 %      Basophils Relative 0 %      Neutrophils Absolute 10.31 Thousands/µL      Absolute Immature Grans 0.06 Thousand/uL      Absolute Lymphocytes 1.45 Thousands/µL      Absolute Monocytes 1.16 Thousand/µL      Eosinophils Absolute 0.00 Thousand/µL      Basophils Absolute 0.03 Thousands/µL     Fingerstick Glucose (POCT) [[id number]]  (Abnormal) Collected: 04/05/24 2019    Lab Status: Final result Specimen: Blood Updated: 04/05/24 2020     POC Glucose 171 mg/dl                    CT spine cervical without contrast   Final Result by Ramo Jackson MD (04/05 2058)      No cervical spine fracture or traumatic malalignment.                  Workstation performed: [id number]         CT head wo contrast   Final Result by Ramo Jackson MD (04/05 2052)      No acute intracranial abnormality.                  Workstation performed: [id number]                    Procedures  ECG 12 Lead Documentation Only    Date/Time: 4/5/2024 8:21 PM    Performed by: Yobany Jean Baptiste MD  Authorized by: Yobany Jean Baptiste MD    Indications / Diagnosis:  Fall  ECG reviewed by me, the ED Provider: yes    Patient location:  ED  Rate:     ECG rate:  71    ECG rate assessment: normal    Rhythm:     Rhythm: sinus rhythm    QRS:     QRS axis:  Left  ST segments:     ST segments:  Non-specific  T waves:     T waves: non-specific    Comments:      Normal sinus rhythm at 71, left axis deviation, , QRS 86, QTc 436, nonspecific ST-T wave abnormality, no definite evidence of acute ischemia           ED Course  ED Course as of 04/05/24 2125 Fri Apr 05,  2024 2058 CT head-    IMPRESSION:     No acute intracranial abnormality.        2118 CT cervical spine-    IMPRESSION:     No cervical spine fracture or traumatic malalignment.                                                  Medical Decision Making  Patient is a 73-year-old male seen in the emergency department with concern for history of fall, head injury, possible history of hypoglycemic episode.  Fingerstick blood glucose was elevated at 171. EKG was obtained and noted, which showed no evidence of arrhythmia.  Patient was provided Tdap.  CT head and cervical spine were obtained, which showed no acute intracranial abnormality or fracture.  Laboratory evaluation remarkable for elevated glucose of 153, low magnesium of 1.6, elevated white blood cell count of 13.01, 79% neutrophils, low red blood cell count of 3.78, low hematocrit of 36.0. Plan to have patient follow up with PCP/outpatient providers.  Patient stable for discharge home.  Discharge instructions were reviewed with patient.    Amount and/or Complexity of Data Reviewed  Labs: ordered. Decision-making details documented in ED Course.  Radiology: ordered. Decision-making details documented in ED Course.  ECG/medicine tests: ordered and independent interpretation performed. Decision-making details documented in ED Course.    Risk  OTC drugs.  Prescription drug management.             Disposition  Final diagnoses:   Fall, initial encounter   Injury of head, initial encounter   Abrasion of forehead, initial encounter   Hypomagnesemia   History of diabetes mellitus     Time reflects when diagnosis was documented in both MDM as applicable and the Disposition within this note       Time User Action Codes Description Comment    4/5/2024  8:07 PM Yobany Jean Baptiste [W19.XXXA] Fall, initial encounter     4/5/2024  8:07 PM Yobany Jean Baptiste [S09.90XA] Injury of head, initial encounter     4/5/2024  8:07 PM Yobany Jean Baptiste [S00.81XA] Abrasion of forehead,  initial encounter     4/5/2024  8:08 PM Yobany Jean Baptiste Modify [W19.XXXA] Fall, initial encounter     4/5/2024  8:58 PM Yobany Jean Baptiste Add [E83.42] Hypomagnesemia     4/5/2024  8:58 PM Yobany Jean Baptiste Modify [E83.42] Hypomagnesemia     4/5/2024  9:24 PM Yobany Jean Baptiste Add [Z86.39] History of diabetes mellitus     4/5/2024  9:24 PM Yobany Jean Baptiste Modify [W19.XXXA] Fall, initial encounter     4/5/2024  9:24 PM Yobany Jean Baptiste Modify [S09.90XA] Injury of head, initial encounter     4/5/2024  9:24 PM Yobany Jean Baptiste Modify [S00.81XA] Abrasion of forehead, initial encounter     4/5/2024  9:24 PM Yobany Jean Baptiste Modify [E83.42] Hypomagnesemia     4/5/2024  9:24 PM Yobany Jean Baptiste Modify [Z86.39] History of diabetes mellitus           ED Disposition       ED Disposition   Discharge    Condition   Stable    Date/Time   Fri Apr 5, 2024  9:21 PM    Comment   William Ricketts discharge to home/self care.                   Follow-up Information       Follow up With Specialties Details Why Contact Info Additional Information    Donny Villarreal MD Family Medicine Call in 1 day  [address] [facility] [address]  [facility] [place] [id number] [phone]       [facility] Call  As needed [facility] [address] [phone] [address], [address], [phone]            Patient's Medications   Discharge Prescriptions    No medications on file           PDMP Review         Value Time User    PDMP Reviewed  Yes 4/2/2024 11:59 AM Yaa Baeza PA-C            ED Provider  Electronically Signed by             Yobany Jean Baptiste MD  04/05/24 212

## 2024-04-05 NOTE — DISCHARGE INSTRUCTIONS
Follow up with your primary doctor/outpatient providers, and return to the emergency department for new or worsening symptoms.      CT BRAIN - WITHOUT CONTRAST     INDICATION:   fall, head injury.     COMPARISON:  None.     TECHNIQUE:  CT examination of the brain was performed.  Multiplanar 2D reformatted images were created from the source data.     Radiation dose length product (DLP) for this visit:  1034.9 mGy-cm .  This examination, like all CT scans performed in the [facility], was performed utilizing techniques to minimize radiation dose exposure, including the use of iterative   reconstruction and automated exposure control.     IMAGE QUALITY:  Diagnostic.     FINDINGS:     PARENCHYMA:  No intracranial mass, mass effect or midline shift. No CT signs of acute infarction.  No acute parenchymal hemorrhage.     There is mild periventricular white matter low attenuation which is nonspecific and most likely related to chronic small vessel ischemic changes.     VENTRICLES AND EXTRA-AXIAL SPACES:  Normal for the patient's age.     VISUALIZED ORBITS: Normal visualized orbits.     PARANASAL SINUSES: Normal visualized paranasal sinuses.     CALVARIUM AND EXTRACRANIAL SOFT TISSUES: There is mild right frontal scalp soft tissue swelling. The calvarium is intact.     IMPRESSION:     No acute intracranial abnormality.         CT CERVICAL SPINE - WITHOUT CONTRAST     INDICATION:   fall, head injury.     COMPARISON:  None.     TECHNIQUE:  CT examination of the cervical spine was performed without intravenous contrast.  Contiguous axial images were obtained. Multiplanar 2D reformatted images were created from the source data.     Radiation dose length product (DLP) for this visit:  180.6 mGy-cm .  This examination, like all CT scans performed in the [facility], was performed utilizing techniques to minimize radiation dose exposure, including the use of iterative   reconstruction and automated  exposure control.     IMAGE QUALITY:  Diagnostic.     FINDINGS:     ALIGNMENT:  Normal alignment of the cervical spine. No subluxation.     VERTEBRAE:  No fracture.     DEGENERATIVE CHANGES: Moderate multilevel cervical degenerative changes are noted. No critical central canal stenosis.     PREVERTEBRAL AND PARASPINAL SOFT TISSUES: Unremarkable     THORACIC INLET: Emphysematous changes are noted at the lung apices.     IMPRESSION:     No cervical spine fracture or traumatic malalignment.

## 2024-04-08 LAB
ATRIAL RATE: 71 BPM
P AXIS: 64 DEGREES
PR INTERVAL: 174 MS
QRS AXIS: -62 DEGREES
QRSD INTERVAL: 86 MS
QT INTERVAL: 402 MS
QTC INTERVAL: 436 MS
T WAVE AXIS: 81 DEGREES
VENTRICULAR RATE: 71 BPM

## 2024-04-08 PROCEDURE — 93010 ELECTROCARDIOGRAM REPORT: CPT | Performed by: INTERNAL MEDICINE

## 2024-04-18 ENCOUNTER — OFFICE VISIT (OUTPATIENT)
Dept: SURGERY | Facility: CLINIC | Age: 74
End: 2024-04-18

## 2024-04-18 ENCOUNTER — TELEPHONE (OUTPATIENT)
Dept: FAMILY MEDICINE CLINIC | Facility: CLINIC | Age: 74
End: 2024-04-18

## 2024-04-18 VITALS
SYSTOLIC BLOOD PRESSURE: 140 MMHG | TEMPERATURE: 97.7 F | HEIGHT: 65 IN | BODY MASS INDEX: 21.99 KG/M2 | DIASTOLIC BLOOD PRESSURE: 90 MMHG | WEIGHT: 132 LBS | OXYGEN SATURATION: 98 % | HEART RATE: 60 BPM

## 2024-04-18 DIAGNOSIS — M79.89 FAT NECROSIS: Primary | ICD-10-CM

## 2024-04-18 PROCEDURE — 99024 POSTOP FOLLOW-UP VISIT: CPT | Performed by: SURGERY

## 2024-04-18 NOTE — TELEPHONE ENCOUNTER
Patient stopped by the office and stated that he needs a refill on his Toujeo.  He states that he still has refills on the last order but they are .  He wants the doctor to send in the order to his Mail Order but that he needs one sent to [facility] on [address] because he is totally out.

## 2024-04-18 NOTE — PROGRESS NOTES
First postop visit after the repair of a recurrent right inguinal hernia post excision of the mass on the left abdominal wall.  His postop convalescence was affected a little bit because he took a fall and ended up in the emergency room a couple days after surgery.  He seems to be okay from now and then he seems to be okay from his operative procedures.  Both wounds of healed completely.  There is no hematoma, seroma, infection or reherniation.  I told him to take his glue off.  By the way, the mass in the left abdominal wall came back as fat necrosis

## 2024-04-18 NOTE — LETTER
April 18, 2024     Donny Villarreal MD  [address] [address]    Patient: William Ricketts   YOB: 1950   Date of Visit: 4/18/2024       Dear Dr. Villarreal:    Thank you for referring William Ricketts to me for evaluation. Below are my notes for this consultation.    If you have questions, please do not hesitate to call me. I look forward to following your patient along with you.         Sincerely,        Robert Bloch, MD        CC: No Recipients    Robert Bloch, MD  4/18/2024 11:08 AM  Incomplete  First postop visit after the repair of a recurrent right inguinal hernia post excision of the mass on the left abdominal wall.  His convalescence was

## 2024-04-19 DIAGNOSIS — E10.9 TYPE 1 DIABETES MELLITUS WITHOUT COMPLICATION (HCC): ICD-10-CM

## 2024-04-19 RX ORDER — INSULIN GLARGINE 300 U/ML
40 INJECTION, SOLUTION SUBCUTANEOUS
Qty: 3 ML | Refills: 0 | Status: SHIPPED | OUTPATIENT
Start: 2024-04-19 | End: 2024-05-12

## 2024-04-19 RX ORDER — INSULIN GLARGINE 300 U/ML
40 INJECTION, SOLUTION SUBCUTANEOUS
Qty: 24 ML | Refills: 1 | Status: SHIPPED | OUTPATIENT
Start: 2024-04-19 | End: 2025-04-14

## 2024-04-19 RX ORDER — INSULIN GLARGINE 300 U/ML
40 INJECTION, SOLUTION SUBCUTANEOUS
Qty: 3 ML | Refills: 0 | Status: SHIPPED | OUTPATIENT
Start: 2024-04-19 | End: 2024-04-19

## 2024-05-28 ENCOUNTER — ANESTHESIA EVENT (OUTPATIENT)
Dept: ANESTHESIOLOGY | Facility: HOSPITAL | Age: 74
End: 2024-05-28

## 2024-05-28 ENCOUNTER — ANESTHESIA (OUTPATIENT)
Dept: ANESTHESIOLOGY | Facility: HOSPITAL | Age: 74
End: 2024-05-28

## 2024-05-31 ENCOUNTER — VBI (OUTPATIENT)
Dept: ADMINISTRATIVE | Facility: OTHER | Age: 74
End: 2024-05-31

## 2024-05-31 NOTE — TELEPHONE ENCOUNTER
05/31/24 2:23 PM     VB [employer] SmartForm used to document caregap status.    Floyd Mclaughlin MA

## 2024-08-27 ENCOUNTER — VBI (OUTPATIENT)
Dept: ADMINISTRATIVE | Facility: OTHER | Age: 74
End: 2024-08-27

## 2024-08-27 NOTE — TELEPHONE ENCOUNTER
08/27/24 9:38 AM     Chart reviewed for CRC: Colonoscopy ; nothing is submitted to the patient's insurance at this time.     Floyd Mclaughlin MA   [facility]

## 2024-09-10 ENCOUNTER — ANESTHESIA EVENT (OUTPATIENT)
Dept: ANESTHESIOLOGY | Facility: HOSPITAL | Age: 74
End: 2024-09-10

## 2024-09-10 ENCOUNTER — ANESTHESIA (OUTPATIENT)
Dept: ANESTHESIOLOGY | Facility: HOSPITAL | Age: 74
End: 2024-09-10

## 2024-09-13 ENCOUNTER — TELEPHONE (OUTPATIENT)
Dept: GASTROENTEROLOGY | Facility: AMBULARY SURGERY CENTER | Age: 74
End: 2024-09-13

## 2024-10-22 DIAGNOSIS — I10 ESSENTIAL HYPERTENSION: ICD-10-CM

## 2024-10-22 DIAGNOSIS — E10.9 TYPE 1 DIABETES MELLITUS WITHOUT COMPLICATION (HCC): ICD-10-CM

## 2024-10-22 DIAGNOSIS — E11.65 TYPE 2 DIABETES MELLITUS WITH HYPERGLYCEMIA, WITH LONG-TERM CURRENT USE OF INSULIN (HCC): ICD-10-CM

## 2024-10-22 DIAGNOSIS — Z79.4 TYPE 2 DIABETES MELLITUS WITH HYPERGLYCEMIA, WITH LONG-TERM CURRENT USE OF INSULIN (HCC): ICD-10-CM

## 2024-10-23 RX ORDER — PRAVASTATIN SODIUM 10 MG
10 TABLET ORAL DAILY
Qty: 90 TABLET | Refills: 0 | Status: SHIPPED | OUTPATIENT
Start: 2024-10-23

## 2024-10-23 RX ORDER — METOPROLOL TARTRATE 25 MG/1
25 TABLET, FILM COATED ORAL 2 TIMES DAILY
Qty: 180 TABLET | Refills: 0 | Status: SHIPPED | OUTPATIENT
Start: 2024-10-23

## 2024-10-23 RX ORDER — LISINOPRIL 20 MG/1
20 TABLET ORAL DAILY
Qty: 90 TABLET | Refills: 0 | Status: SHIPPED | OUTPATIENT
Start: 2024-10-23

## 2024-10-23 RX ORDER — INSULIN GLARGINE 300 U/ML
INJECTION, SOLUTION SUBCUTANEOUS
Qty: 4 ML | Refills: 0 | Status: SHIPPED | OUTPATIENT
Start: 2024-10-23

## 2024-10-23 RX ORDER — PEN NEEDLE, DIABETIC 29 G X1/2"
NEEDLE, DISPOSABLE MISCELLANEOUS
Qty: 400 EACH | Refills: 0 | Status: SHIPPED | OUTPATIENT
Start: 2024-10-23

## 2024-10-23 RX ORDER — METFORMIN HYDROCHLORIDE 500 MG/1
1000 TABLET, EXTENDED RELEASE ORAL 2 TIMES DAILY
Qty: 360 TABLET | Refills: 0 | Status: SHIPPED | OUTPATIENT
Start: 2024-10-23

## 2024-10-23 RX ORDER — INSULIN ASPART 100 [IU]/ML
INJECTION, SOLUTION INTRAVENOUS; SUBCUTANEOUS
Qty: 15 ML | Refills: 0 | Status: SHIPPED | OUTPATIENT
Start: 2024-10-23

## 2024-10-30 ENCOUNTER — ANESTHESIA EVENT (OUTPATIENT)
Dept: ANESTHESIOLOGY | Facility: HOSPITAL | Age: 74
End: 2024-10-30

## 2024-10-30 ENCOUNTER — ANESTHESIA (OUTPATIENT)
Dept: ANESTHESIOLOGY | Facility: HOSPITAL | Age: 74
End: 2024-10-30

## 2024-11-07 ENCOUNTER — TELEPHONE (OUTPATIENT)
Dept: GASTROENTEROLOGY | Facility: AMBULARY SURGERY CENTER | Age: 74
End: 2024-11-07

## 2024-11-12 ENCOUNTER — TELEPHONE (OUTPATIENT)
Dept: GASTROENTEROLOGY | Facility: AMBULARY SURGERY CENTER | Age: 74
End: 2024-11-12

## 2024-11-12 NOTE — TELEPHONE ENCOUNTER
Pt called to r/s colonoscopy. Colonoscopy was scheduled for 11/13/24 and is now r/s to 12/31/24 with Dr. Jackson in [facility]. Pt needs instructions mailed to his home.

## 2024-11-21 ENCOUNTER — VBI (OUTPATIENT)
Dept: ADMINISTRATIVE | Facility: OTHER | Age: 74
End: 2024-11-21

## 2024-11-21 NOTE — TELEPHONE ENCOUNTER
11/21/24 2:17 PM     Chart reviewed for BP ; nothing is submitted to the patient's insurance at this time.     Floyd Mclaughlin MA   [facility]

## 2024-12-17 ENCOUNTER — ANESTHESIA EVENT (OUTPATIENT)
Dept: ANESTHESIOLOGY | Facility: HOSPITAL | Age: 74
End: 2024-12-17

## 2024-12-17 ENCOUNTER — ANESTHESIA (OUTPATIENT)
Dept: ANESTHESIOLOGY | Facility: HOSPITAL | Age: 74
End: 2024-12-17

## 2024-12-23 ENCOUNTER — TELEPHONE (OUTPATIENT)
Dept: GASTROENTEROLOGY | Facility: AMBULARY SURGERY CENTER | Age: 74
End: 2024-12-23

## 2024-12-30 ENCOUNTER — TELEPHONE (OUTPATIENT)
Dept: GASTROENTEROLOGY | Facility: AMBULARY SURGERY CENTER | Age: 74
End: 2024-12-30

## 2024-12-30 DIAGNOSIS — H40.9 GLAUCOMA OF BOTH EYES, UNSPECIFIED GLAUCOMA TYPE: ICD-10-CM

## 2024-12-30 DIAGNOSIS — E10.9 TYPE 1 DIABETES MELLITUS WITHOUT COMPLICATION (HCC): ICD-10-CM

## 2024-12-30 RX ORDER — INSULIN GLARGINE 300 U/ML
40 INJECTION, SOLUTION SUBCUTANEOUS
Qty: 24 ML | Refills: 0 | Status: SHIPPED | OUTPATIENT
Start: 2024-12-30 | End: 2025-12-25

## 2024-12-30 RX ORDER — INSULIN ASPART 100 [IU]/ML
INJECTION, SOLUTION INTRAVENOUS; SUBCUTANEOUS
Qty: 15 ML | Refills: 0 | Status: SHIPPED | OUTPATIENT
Start: 2024-12-30

## 2024-12-30 RX ORDER — LATANOPROST 50 UG/ML
1 SOLUTION/ DROPS OPHTHALMIC
Qty: 7.5 ML | Refills: 0 | Status: SHIPPED | OUTPATIENT
Start: 2024-12-30

## 2024-12-30 NOTE — TELEPHONE ENCOUNTER
Patients GI provider:  Dr. Jackson    Number to return call: [phone]    Reason for call: Pt calling to cxl his colonoscopy for tomorrow, he would like to do a cologuard test instead and is requesting the Dr order that for him. Please advise?

## 2024-12-30 NOTE — TELEPHONE ENCOUNTER
Reason for call:   [x] Refill   [] Prior Auth  [] Other:     Office:   [x] PCP/Provider -   [] Specialty/Provider -     nsulin glargine (Toujeo SoloStar) 300 units/mL CONCENTRATED U-300 injection pen (1-unit dial) 40 Units, Subcutaneous, Daily at bedtime         latanoprost (XALATAN) 0.005 % ophthalmic solution 1 drop, Both Eyes, Daily at bedtime             NovoLOG FlexPen 100 units/mL injection pen               Quantity: 90 day supply    Ph[facility]acy: [facility]    Does the patient have enough for 3 days?   [] Yes   [x] No - Send as HP to POD

## 2024-12-30 NOTE — TELEPHONE ENCOUNTER
We have never seen this patient previously. He would need to discuss cologuard with his PCP as he needs to be assessed for contraindications to the testing and it needs to be ordered by a provider that he follows with. He was only scheduled for open access colonoscopy.

## 2025-01-23 ENCOUNTER — VBI (OUTPATIENT)
Dept: ADMINISTRATIVE | Facility: OTHER | Age: 75
End: 2025-01-23

## 2025-01-23 NOTE — TELEPHONE ENCOUNTER
01/23/25 10:47 AM     Chart reviewed for CRC: Colonoscopy ; nothing is submitted to the patient's insurance at this time.     Floyd Mclaughlin MA   [facility]

## 2025-01-28 DIAGNOSIS — I10 ESSENTIAL HYPERTENSION: ICD-10-CM

## 2025-01-28 DIAGNOSIS — E10.9 TYPE 1 DIABETES MELLITUS WITHOUT COMPLICATION (HCC): ICD-10-CM

## 2025-01-28 DIAGNOSIS — Z79.4 TYPE 2 DIABETES MELLITUS WITH HYPERGLYCEMIA, WITH LONG-TERM CURRENT USE OF INSULIN (HCC): ICD-10-CM

## 2025-01-28 DIAGNOSIS — E11.65 TYPE 2 DIABETES MELLITUS WITH HYPERGLYCEMIA, WITH LONG-TERM CURRENT USE OF INSULIN (HCC): ICD-10-CM

## 2025-01-28 RX ORDER — INSULIN GLARGINE 300 U/ML
40 INJECTION, SOLUTION SUBCUTANEOUS
Qty: 48 ML | Refills: 0 | Status: SHIPPED | OUTPATIENT
Start: 2025-01-28 | End: 2026-01-23

## 2025-01-28 RX ORDER — LISINOPRIL 20 MG/1
20 TABLET ORAL DAILY
Qty: 100 TABLET | Refills: 3 | Status: SHIPPED | OUTPATIENT
Start: 2025-01-28

## 2025-01-28 RX ORDER — ISOPROPYL ALCOHOL 70 ML/100ML
SWAB TOPICAL
Qty: 400 EACH | Refills: 0 | Status: SHIPPED | OUTPATIENT
Start: 2025-01-28

## 2025-01-28 RX ORDER — PRAVASTATIN SODIUM 10 MG
10 TABLET ORAL DAILY
Qty: 100 TABLET | Refills: 3 | Status: SHIPPED | OUTPATIENT
Start: 2025-01-28

## 2025-01-28 RX ORDER — BLOOD SUGAR DIAGNOSTIC
STRIP MISCELLANEOUS 4 TIMES DAILY
Qty: 300 EACH | Refills: 1 | Status: SHIPPED | OUTPATIENT
Start: 2025-01-28

## 2025-01-28 RX ORDER — INSULIN ASPART 100 [IU]/ML
15 INJECTION, SOLUTION INTRAVENOUS; SUBCUTANEOUS
Qty: 42 ML | Refills: 1 | Status: SHIPPED | OUTPATIENT
Start: 2025-01-28

## 2025-01-28 RX ORDER — METOPROLOL TARTRATE 25 MG/1
25 TABLET, FILM COATED ORAL 2 TIMES DAILY
Qty: 180 TABLET | Refills: 0 | Status: SHIPPED | OUTPATIENT
Start: 2025-01-28

## 2025-01-28 RX ORDER — METFORMIN HYDROCHLORIDE 500 MG/1
1000 TABLET, EXTENDED RELEASE ORAL 2 TIMES DAILY
Qty: 360 TABLET | Refills: 0 | Status: SHIPPED | OUTPATIENT
Start: 2025-01-28

## 2025-02-21 ENCOUNTER — TELEPHONE (OUTPATIENT)
Age: 75
End: 2025-02-21

## 2025-02-21 DIAGNOSIS — Z79.4 TYPE 2 DIABETES MELLITUS WITH HYPERGLYCEMIA, WITH LONG-TERM CURRENT USE OF INSULIN (HCC): ICD-10-CM

## 2025-02-21 DIAGNOSIS — E11.65 TYPE 2 DIABETES MELLITUS WITH HYPERGLYCEMIA, WITH LONG-TERM CURRENT USE OF INSULIN (HCC): ICD-10-CM

## 2025-02-21 NOTE — TELEPHONE ENCOUNTER
[facility] called.  The received an Rx for Insulin Syringe-Needle U-100, but pt needs the pen needles.  Please send  new Rx.

## 2025-02-22 RX ORDER — PEN NEEDLE, DIABETIC 29 G X1/2"
NEEDLE, DISPOSABLE MISCELLANEOUS 4 TIMES DAILY
Qty: 400 EACH | Refills: 0 | Status: SHIPPED | OUTPATIENT
Start: 2025-02-22

## 2025-04-26 DIAGNOSIS — Z79.4 TYPE 2 DIABETES MELLITUS WITH HYPERGLYCEMIA, WITH LONG-TERM CURRENT USE OF INSULIN (HCC): ICD-10-CM

## 2025-04-26 DIAGNOSIS — I10 ESSENTIAL HYPERTENSION: ICD-10-CM

## 2025-04-26 DIAGNOSIS — E11.65 TYPE 2 DIABETES MELLITUS WITH HYPERGLYCEMIA, WITH LONG-TERM CURRENT USE OF INSULIN (HCC): ICD-10-CM

## 2025-04-28 RX ORDER — METOPROLOL TARTRATE 25 MG/1
25 TABLET, FILM COATED ORAL 2 TIMES DAILY
Qty: 180 TABLET | Refills: 0 | Status: SHIPPED | OUTPATIENT
Start: 2025-04-28

## 2025-04-28 RX ORDER — METFORMIN HYDROCHLORIDE 500 MG/1
1000 TABLET, EXTENDED RELEASE ORAL 2 TIMES DAILY
Qty: 360 TABLET | Refills: 0 | Status: SHIPPED | OUTPATIENT
Start: 2025-04-28

## 2025-04-28 RX ORDER — PEN NEEDLE, DIABETIC 31 GX5/16"
NEEDLE, DISPOSABLE MISCELLANEOUS 4 TIMES DAILY
Qty: 400 EACH | Refills: 0 | Status: SHIPPED | OUTPATIENT
Start: 2025-04-28

## 2025-05-13 ENCOUNTER — DOCUMENTATION (OUTPATIENT)
Dept: ADMINISTRATIVE | Facility: OTHER | Age: 75
End: 2025-05-13

## 2025-05-13 NOTE — PROGRESS NOTES
05/13/25 10:45 AM    Annual Wellness Visit outreach is not required, patient has an upcoming appointment with the PCP office.    Thank you.  Buffy Herrera MA  [facility]

## 2025-05-27 ENCOUNTER — TELEPHONE (OUTPATIENT)
Dept: FAMILY MEDICINE CLINIC | Facility: CLINIC | Age: 75
End: 2025-05-27

## 2025-05-27 DIAGNOSIS — E10.9 TYPE 1 DIABETES MELLITUS WITHOUT COMPLICATION (HCC): ICD-10-CM

## 2025-05-27 RX ORDER — INSULIN GLARGINE 300 U/ML
40 INJECTION, SOLUTION SUBCUTANEOUS
Qty: 48 ML | Refills: 0 | Status: SHIPPED | OUTPATIENT
Start: 2025-05-27 | End: 2026-05-22

## 2025-05-27 NOTE — TELEPHONE ENCOUNTER
Patient stopped in office requesting refill of Toujeo solostar 300ML injection pen. He states he is completley out and needs a short supply to get him to his appointment on 5/29. Send to [facility] on [facility].

## 2025-05-28 RX ORDER — ASPIRIN 81 MG/1
TABLET, COATED ORAL
COMMUNITY
Start: 2025-04-26

## 2025-05-29 ENCOUNTER — OFFICE VISIT (OUTPATIENT)
Dept: FAMILY MEDICINE CLINIC | Facility: CLINIC | Age: 75
End: 2025-05-29
Payer: COMMERCIAL

## 2025-05-29 VITALS
OXYGEN SATURATION: 97 % | SYSTOLIC BLOOD PRESSURE: 140 MMHG | DIASTOLIC BLOOD PRESSURE: 80 MMHG | BODY MASS INDEX: 20.41 KG/M2 | WEIGHT: 127 LBS | HEIGHT: 66 IN | HEART RATE: 68 BPM

## 2025-05-29 DIAGNOSIS — Z12.5 SCREENING FOR PROSTATE CANCER: ICD-10-CM

## 2025-05-29 DIAGNOSIS — E61.1 IRON DEFICIENCY: ICD-10-CM

## 2025-05-29 DIAGNOSIS — E78.2 MIXED HYPERLIPIDEMIA: ICD-10-CM

## 2025-05-29 DIAGNOSIS — Z12.11 SCREENING FOR COLON CANCER: ICD-10-CM

## 2025-05-29 DIAGNOSIS — Z00.00 WELL ADULT EXAM: Primary | ICD-10-CM

## 2025-05-29 DIAGNOSIS — I10 ESSENTIAL HYPERTENSION: ICD-10-CM

## 2025-05-29 DIAGNOSIS — E53.8 B12 DEFICIENCY: ICD-10-CM

## 2025-05-29 DIAGNOSIS — E10.69 TYPE 1 DIABETES MELLITUS WITH OTHER SPECIFIED COMPLICATION (HCC): ICD-10-CM

## 2025-05-29 DIAGNOSIS — E55.9 VITAMIN D DEFICIENCY: ICD-10-CM

## 2025-05-29 LAB
LEFT EYE DIABETIC RETINOPATHY: NORMAL
LEFT EYE IMAGE QUALITY: NORMAL
LEFT EYE MACULAR EDEMA: NORMAL
LEFT EYE OTHER RETINOPATHY: NORMAL
RIGHT EYE DIABETIC RETINOPATHY: NORMAL
RIGHT EYE IMAGE QUALITY: NORMAL
RIGHT EYE MACULAR EDEMA: NORMAL
RIGHT EYE OTHER RETINOPATHY: NORMAL
SEVERITY (EYE EXAM): NORMAL

## 2025-05-29 PROCEDURE — G0439 PPPS, SUBSEQ VISIT: HCPCS | Performed by: FAMILY MEDICINE

## 2025-05-29 PROCEDURE — 99214 OFFICE O/P EST MOD 30 MIN: CPT | Performed by: FAMILY MEDICINE

## 2025-05-29 PROCEDURE — G2211 COMPLEX E/M VISIT ADD ON: HCPCS | Performed by: FAMILY MEDICINE

## 2025-05-29 RX ORDER — METFORMIN HYDROCHLORIDE 500 MG/1
1000 TABLET, EXTENDED RELEASE ORAL 2 TIMES DAILY
Qty: 360 TABLET | Refills: 0 | Status: SHIPPED | OUTPATIENT
Start: 2025-05-29

## 2025-05-29 RX ORDER — METOPROLOL TARTRATE 25 MG/1
25 TABLET, FILM COATED ORAL 2 TIMES DAILY
Qty: 180 TABLET | Refills: 1 | Status: SHIPPED | OUTPATIENT
Start: 2025-05-29

## 2025-05-29 RX ORDER — LISINOPRIL 20 MG/1
20 TABLET ORAL DAILY
Qty: 90 TABLET | Refills: 1 | Status: SHIPPED | OUTPATIENT
Start: 2025-05-29

## 2025-05-29 RX ORDER — ESCITALOPRAM OXALATE 20 MG/1
20 TABLET ORAL DAILY
Qty: 90 TABLET | Refills: 1 | Status: SHIPPED | OUTPATIENT
Start: 2025-05-29

## 2025-05-29 NOTE — ASSESSMENT & PLAN NOTE
[facility] Daily Progress Note    Patient Name: Marychuy Verdugo  : 1939  MRN: [id number]  Primary Care Physician:  Curt Ng MD  Date of admission: 2/10/2022      Subjective      Chief Complaint: Fall with left femur fracture    Subjective  Patient denies for any nausea vomiting.  No chest pain    Review of Systems   All other systems reviewed and are negative.        Objective      Vitals:   Heart Rate:  [] 96  Resp:  [16] 16  BP: (107-170)/(63-97) 145/63    Physical Exam  Vitals and nursing note reviewed.   Constitutional:       General: She is not in acute distress.     Appearance: Normal appearance. She is well-developed. She is not ill-appearing, toxic-appearing or diaphoretic.   HENT:      Head: Normocephalic and atraumatic.      Right Ear: Ear canal and external ear normal.      Left Ear: Ear canal and external ear normal.      Nose: Nose normal. No congestion or rhinorrhea.      Mouth/Throat:      Mouth: Mucous membranes are moist.      Pharynx: No oropharyngeal exudate.   Eyes:      General: No scleral icterus.        Right eye: No discharge.         Left eye: No discharge.      Extraocular Movements: Extraocular movements intact.      Conjunctiva/sclera: Conjunctivae normal.      Pupils: Pupils are equal, round, and reactive to light.   Neck:      Thyroid: No thyromegaly.      Vascular: No carotid bruit or JVD.      Trachea: No tracheal deviation.   Cardiovascular:      Rate and Rhythm: Normal rate and regular rhythm.      Pulses: Normal pulses.      Heart sounds: Normal heart sounds. No murmur heard.  No friction rub. No gallop.    Pulmonary:      Effort: Pulmonary effort is normal. No respiratory distress.      Breath sounds: Normal breath sounds. No stridor. No wheezing, rhonchi or rales.   Chest:      Chest wall: No tenderness.   Abdominal:      General: Bowel sounds are normal. There is no distension.      Palpations: Abdomen is soft. There    Lab Results   Component Value Date    HGBA1C 8.6 (H) 03/28/2024       Orders:    IRIS Diabetic eye exam    Albumin / creatinine urine ratio; Future    CBC and differential    Comprehensive metabolic panel    Hemoglobin A1C    Iron Panel (Includes Ferritin, Iron Sat%, Iron, and TIBC)    Lipid Panel with Direct LDL reflex    PSA, Total Screen; Future    Vitamin D 25 hydroxy    Vitamin B12    TSH, 3rd generation with Free T4 reflex    lisinopril (ZESTRIL) 20 mg tablet; Take 1 tablet (20 mg total) by mouth daily    metFORMIN (GLUCOPHAGE-XR) 500 mg 24 hr tablet; Take 2 tablets (1,000 mg total) by mouth 2 (two) times a day     is no mass.      Tenderness: There is no abdominal tenderness. There is no guarding or rebound.      Hernia: No hernia is present.   Musculoskeletal:         General: No swelling, tenderness, deformity or signs of injury. Normal range of motion.      Cervical back: Normal range of motion and neck supple. No rigidity. No muscular tenderness.      Right lower leg: No edema.      Left lower leg: No edema.   Lymphadenopathy:      Cervical: No cervical adenopathy.   Skin:     General: Skin is warm and dry.      Coloration: Skin is not jaundiced or pale.      Findings: No bruising, erythema or rash.   Neurological:      General: No focal deficit present.      Mental Status: She is alert and oriented to person, place, and time. Mental status is at baseline.      Cranial Nerves: No cranial nerve deficit.      Sensory: No sensory deficit.      Motor: No weakness or abnormal muscle tone.      Coordination: Coordination normal.   Psychiatric:         Mood and Affect: Mood normal.         Behavior: Behavior normal.         Thought Content: Thought content normal.         Judgment: Judgment normal.             Result Review    Result Review:  I have personally reviewed the results from the time of this admission to 2/11/2022 15:10 EST and agree with these findings:  [x]  Laboratory  [x]  Microbiology  [x]  Radiology  []  EKG/Telemetry   []  Cardiology/Vascular   []  Pathology  []  Old records  []  Other:  Most notable findings include:           Assessment/Plan      Brief Patient Summary:  Marychuy Verdugo is a 82 y.o. female who     lidocaine, 1 patch, Transdermal, Q24H  polyethylene glycol, 17 g, Oral, Daily  sodium chloride, 3 mL, Intravenous, Q12H       lactated ringers, 75 mL/hr, Last Rate: 75 mL/hr (02/10/22 0497)         Active Hospital Problems:  Active Hospital Problems    Diagnosis    • **Left displaced femoral neck fracture (HCC)    • Peripheral edema    • Overactive bladder    • Presence of Watchman left atrial  appendage closure device    • Atrial fibrillation (HCC)    • GERD (gastroesophageal reflux disease)    • Hypothyroid      Plan:     Fall with Left Femoral Neck Fracture:  - Fall precautions and ambulate with assistance  - Ortho consulted, plan for surgery tomorrow noted  - Cardiology consulted for cardiac clearance for surgery   - Complete Bedrest  - PT/OT to be consulted post op      Chronic Afib with Watchman:  - continue metoprolol for rate control  - Holding ASA for possible surgery in the AM     Hypothyroidism:  - Continue levothyroxine  - TSH ordered      Overactive bladder:  - Continue Myrbetriq     GERD:  - Continue PPI     Chronic peripheral edema:  - Continue Lasix     Level Of Support Discussed With: Patient; Next of Kin (If No Surrogate)  Code Status (Patient has no pulse and is not breathing): No CPR (Do Not Attempt to Resuscitate)  Medical Interventions (Patient has pulse or is breathing): Full Support    DVT prophylaxis:  Mechanical DVT prophylaxis orders are present.    CODE STATUS:    Level Of Support Discussed With: Patient; Next of Kin (If No Surrogate)  Code Status (Patient has no pulse and is not breathing): No CPR (Do Not Attempt to Resuscitate)  Medical Interventions (Patient has pulse or is breathing): Full Support      Disposition:  I expect patient to be discharged as per clinical course    This patient has been examined wearing appropriate Personal Protective Equipment and discussed with hospital infection control department. 02/11/22      Electronically signed by Shefali De Los Santos MD, 02/11/22, 15:10 EST.  [facility]ist [facility]

## 2025-05-29 NOTE — ASSESSMENT & PLAN NOTE
Orders:    metoprolol tartrate (LOPRESSOR) 25 mg tablet; Take 1 tablet (25 mg total) by mouth 2 (two) times a day

## 2025-05-29 NOTE — PATIENT INSTRUCTIONS
Medicare Preventive Visit Patient Instructions  Thank you for completing your Welcome to Medicare Visit or Medicare Annual Wellness Visit today. Your next wellness visit will be due in one year (5/30/2026).  The screening/preventive services that you may require over the next 5-10 years are detailed below. Some tests may not apply to you based off risk factors and/or age. Screening tests ordered at today's visit but not completed yet may show as past due. Also, please note that scanned in results may not display below.  Preventive Screenings:  Service Recommendations Previous Testing/Comments   Colorectal Cancer Screening  Colonoscopy    Fecal Occult Blood Test (FOBT)/Fecal Immunochemical Test (FIT)  Fecal DNA/Cologuard Test  Flexible Sigmoidoscopy Age: 45-75 years old   Colonoscopy: every 10 years (May be performed more frequently if at higher risk)  OR  FOBT/FIT: every 1 year  OR  Cologuard: every 3 years  OR  Sigmoidoscopy: every 5 years  Screening may be recommended earlier than age 45 if at higher risk for colorectal cancer. Also, an individualized decision between you and your healthcare provider will decide whether screening between the ages of 76-85 would be appropriate. Colonoscopy: Not on file  FOBT/FIT: Not on file  Cologuard: Not on file  Sigmoidoscopy: Not on file    Due for Cologuard     Prostate Cancer Screening Individualized decision between patient and health care provider in men between ages of 55-69   Medicare will cover every 12 months beginning on the day after your 50th birthday PSA: 2.46 ng/mL     Screening Current     Hepatitis C Screening Once for adults born between 1945 and 1965  More frequently in patients at high risk for Hepatitis C Hep C Antibody: 12/05/2023    Screening Current   Diabetes Screening 1-2 times per year if you're at risk for diabetes or have pre-diabetes Fasting glucose: 225 mg/dL (3/28/2024)  A1C: 8.6 % (3/28/2024)  Screening Not Indicated  History Diabetes  Due for  Blood Glucose   Cholesterol Screening Once every 5 years if you don't have a lipid disorder. May order more often based on risk factors. Lipid panel: 12/05/2023  Screening Current  Due for Lipid Panel      Other Preventive Screenings Covered by Medicare:  Abdominal Aortic Aneurysm (AAA) Screening: covered once if your at risk. You're considered to be at risk if you have a family history of AAA or a male between the age of 65-75 who smoking at least 100 cigarettes in your lifetime.  Lung Cancer Screening: covers low dose CT scan once per year if you meet all of the following conditions: (1) Age 55-77; (2) No signs or symptoms of lung cancer; (3) Current smoker or have quit smoking within the last 15 years; (4) You have a tobacco smoking history of at least 20 pack years (packs per day x number of years you smoked); (5) You get a written order from a healthcare provider.  Glaucoma Screening: covered annually if you're considered high risk: (1) You have diabetes OR (2) Family history of glaucoma OR (3)  aged 50 and older OR (4)  American aged 65 and older  Osteoporosis Screening: covered every 2 years if you meet one of the following conditions: (1) Have a vertebral abnormality; (2) On glucocorticoid therapy for more than 3 months; (3) Have primary hyperparathyroidism; (4) On osteoporosis medications and need to assess response to drug therapy.  HIV Screening: covered annually if you're between the age of 15-65. Also covered annually if you are younger than 15 and older than 65 with risk factors for HIV infection. For pregnant patients, it is covered up to 3 times per pregnancy.    Immunizations:  Immunization Recommendations   Influenza Vaccine Annual influenza vaccination during flu season is recommended for all persons aged >= 6 months who do not have contraindications   Pneumococcal Vaccine   * Pneumococcal conjugate vaccine = PCV13 (Prevnar 13), PCV15 (Vaxneuvance), PCV20 (Prevnar 20)  *  Pneumococcal polysaccharide vaccine = PPSV23 (Pneumovax) Adults 19-65 yo with certain risk factors or if 65+ yo  If never received any pneumonia vaccine: recommend Prevnar 20 (PCV20)  Give PCV20 if previously received 1 dose of PCV13 or PPSV23   Hepatitis B Vaccine 3 dose series if at intermediate or high risk (ex: diabetes, end stage renal disease, liver disease)   Respiratory syncytial virus (RSV) Vaccine - COVERED BY MEDICARE PART D  * RSVPreF3 (Arexvy) CDC recommends that adults 60 years of age and older may receive a single dose of RSV vaccine using shared clinical decision-making (SCDM)   Tetanus (Td) Vaccine - COST NOT COVERED BY MEDICARE PART B Following completion of primary series, a booster dose should be given every 10 years to maintain immunity against tetanus. Td may also be given as tetanus wound prophylaxis.   Tdap Vaccine - COST NOT COVERED BY MEDICARE PART B Recommended at least once for all adults. For pregnant patients, recommended with each pregnancy.   Shingles Vaccine (Shingrix) - COST NOT COVERED BY MEDICARE PART B  2 shot series recommended in those 19 years and older who have or will have weakened immune systems or those 50 years and older     Health Maintenance Due:      Topic Date Due   • Colorectal Cancer Screening  Never done   • Hepatitis C Screening  Completed     Immunizations Due:      Topic Date Due   • COVID-19 Vaccine (3 - 2024-25 season) 09/01/2024     Advance Directives   What are advance directives?  Advance directives are legal documents that state your wishes and plans for medical care. These plans are made ahead of time in case you lose your ability to make decisions for yourself. Advance directives can apply to any medical decision, such as the treatments you want, and if you want to donate organs.   What are the types of advance directives?  There are many types of advance directives, and each state has rules about how to use them. You may choose a combination of any of  the following:  Living will:  This is a written record of the treatment you want. You can also choose which treatments you do not want, which to limit, and which to stop at a certain time. This includes surgery, medicine, IV fluid, and tube feedings.   Durable power of  for healthcare (DPAHC):  This is a written record that states who you want to make healthcare choices for you when you are unable to make them for yourself. This person, called a proxy, is usually a family member or a friend. You may choose more than 1 proxy.  Do not resuscitate (DNR) order:  A DNR order is used in case your heart stops beating or you stop breathing. It is a request not to have certain forms of treatment, such as CPR. A DNR order may be included in other types of advance directives.  Medical directive:  This covers the care that you want if you are in a coma, near death, or unable to make decisions for yourself. You can list the treatments you want for each condition. Treatment may include pain medicine, surgery, blood transfusions, dialysis, IV or tube feedings, and a ventilator (breathing machine).  Values history:  This document has questions about your views, beliefs, and how you feel and think about life. This information can help others choose the care that you would choose.  Why are advance directives important?  An advance directive helps you control your care. Although spoken wishes may be used, it is better to have your wishes written down. Spoken wishes can be misunderstood, or not followed. Treatments may be given even if you do not want them. An advance directive may make it easier for your family to make difficult choices about your care.   Fall Prevention    Fall prevention  includes ways to make your home and other areas safer. It also includes ways you can move more carefully to prevent a fall. Health conditions that cause changes in your blood pressure, vision, or muscle strength and coordination may  increase your risk for falls. Medicines may also increase your risk for falls if they make you dizzy, weak, or sleepy.   Fall prevention tips:   Stand or sit up slowly.    Use assistive devices as directed.    Wear shoes that fit well and have soles that .    Wear a personal alarm.    Stay active.    Manage your medical conditions.    Home Safety Tips:  Add items to prevent falls in the bathroom.    Keep paths clear.    Install bright lights in your home.    Keep items you use often on shelves within reach.    Paint or place reflective tape on the edges of your stairs.     © Copyright [employer] 2018 Information is for End User's use only and may not be sold, redistributed or otherwise used for commercial purposes. All illustrations and images included in CareNotes® are the copyrighted property of [employer] or [employer]

## 2025-05-29 NOTE — PROGRESS NOTES
Name: William Ricketts      : 1950      MRN: [id number]  Encounter Provider: Donny Villarreal MD  Encounter Date: 2025   Encounter department: [facility] [place]  :  Assessment & Plan  Mixed hyperlipidemia    Orders:    Lipid Panel with Direct LDL reflex    Well adult exam    Orders:    escitalopram (LEXAPRO) 20 mg tablet; Take 1 tablet (20 mg total) by mouth daily    Essential hypertension    Orders:    metoprolol tartrate (LOPRESSOR) 25 mg tablet; Take 1 tablet (25 mg total) by mouth 2 (two) times a day    Type 1 diabetes mellitus with other specified complication (HCC)    Lab Results   Component Value Date    HGBA1C 8.6 (H) 2024       Orders:    IRIS Diabetic eye exam    Albumin / creatinine urine ratio; Future    CBC and differential    Comprehensive metabolic panel    Hemoglobin A1C    Iron Panel (Includes Ferritin, Iron Sat%, Iron, and TIBC)    Lipid Panel with Direct LDL reflex    PSA, Total Screen; Future    Vitamin D 25 hydroxy    Vitamin B12    TSH, 3rd generation with Free T4 reflex    lisinopril (ZESTRIL) 20 mg tablet; Take 1 tablet (20 mg total) by mouth daily    metFORMIN (GLUCOPHAGE-XR) 500 mg 24 hr tablet; Take 2 tablets (1,000 mg total) by mouth 2 (two) times a day    Iron deficiency    Orders:    Iron Panel (Includes Ferritin, Iron Sat%, Iron, and TIBC)    Screening for prostate cancer    Orders:    PSA, Total Screen; Future    Vitamin D deficiency    Orders:    Vitamin D 25 hydroxy    B12 deficiency    Orders:    Vitamin B12    Screening for colon cancer    Orders:    Cologuard       Preventive health issues were discussed with patient, and age appropriate screening tests were ordered as noted in patient's After Visit Summary. Personalized health advice and appropriate referrals for health education or preventive services given if needed, as noted in patient's After Visit Summary.    History of Present Illness     HPI     Here to go over chronic issues and labs /  imaging studies if applicable.   Diabetes IDDM - history of low lows     Lowest 60 in AM   Highest 350's       Toujeo 22  Novolog 12     On average       Patient Care Team:  Donny Villarreal MD as PCP - General    Review of Systems   Constitutional:  Negative for activity change, appetite change and fever.   HENT:  Negative for congestion, nosebleeds and trouble swallowing.    Eyes:  Negative for itching.   Respiratory:  Negative for cough and chest tightness.    Cardiovascular:  Negative for chest pain and palpitations.   Gastrointestinal:  Negative for abdominal pain, constipation, diarrhea and nausea.   Endocrine: Negative for cold intolerance.   Genitourinary:  Negative for frequency.   Musculoskeletal:  Negative for gait problem and joint swelling.   Skin:  Negative for rash.   Allergic/Immunologic: Negative for immunocompromised state.   Neurological:  Negative for dizziness, tremors, seizures, syncope and headaches.   Psychiatric/Behavioral:  Negative for hallucinations and suicidal ideas.      Medical History Reviewed by provider this encounter:       Annual Wellness Visit Questionnaire   William is here for his Subsequent Wellness visit. Last Medicare Wellness visit information reviewed, patient interviewed and updates made to the record today.      Health Risk Assessment:   Patient rates overall health as good. Patient feels that their physical health rating is same. Patient is satisfied with their life. Eyesight was rated as same. Hearing was rated as same. Patient feels that their emotional and mental health rating is same. Patients states they are never, rarely angry. Patient states they are never, rarely unusually tired/fatigued. Pain experienced in the last 7 days has been none. Patient states that he has experienced no weight loss or gain in last 6 months.     Depression Screening:   PHQ-2 Score: 0      Fall Risk Screening:   In the past year, patient has experienced: history of falling in past year     Number of falls: 1  Injured during fall?: No    Feels unsteady when standing or walking?: No    Worried about falling?: No      Home Safety:  Patient does not have trouble with stairs inside or outside of their home. Patient has working smoke alarms and has working carbon monoxide detector. Home safety hazards include: none.     Nutrition:   Current diet is Diabetic.     Medications:   Patient is currently taking over-the-counter supplements. OTC medications include: see medication list. Patient is able to manage medications.     Activities of Daily Living (ADLs)/Instrumental Activities of Daily Living (IADLs):   Walk and transfer into and out of bed and chair?: Yes  Dress and groom yourself?: Yes    Bathe or shower yourself?: Yes    Feed yourself? Yes  Do your laundry/housekeeping?: Yes  Manage your money, pay your bills and track your expenses?: Yes  Make your own meals?: Yes    Do your own shopping?: Yes    Previous Hospitalizations:   Any hospitalizations or ED visits within the last 12 months?: Yes    How many hospitalizations have you had in the last year?: 1-2    Advance Care Planning:   Living will: No    Durable POA for healthcare: No    Advanced directive: No    Five wishes given: No      Cognitive Screening:   Provider or family/friend/caregiver concerned regarding cognition?: No    Preventive Screenings      Cardiovascular Screening:    General: Screening Current    Due for: Lipid Panel      Diabetes Screening:     General: Screening Not Indicated and History Diabetes    Due for: Blood Glucose      Colorectal Cancer Screening:       Due for: Cologuard      Prostate Cancer Screening:    General: Screening Current      Osteoporosis Screening:    General: Screening Not Indicated      Abdominal Aortic Aneurysm (AAA) Screening:    Risk factors include: age between 65-76 yo and tobacco use        Lung Cancer Screening:     General: Screening Not Indicated      Hepatitis C Screening:    General: Screening  "Current    Immunizations:  - Immunizations due: Zoster (Shingrix)    Screening, Brief Intervention, and Referral to Treatment (SBIRT)     Screening  Typical number of drinks in a day: 0  Typical number of drinks in a week: 0  Interpretation: Low risk drinking behavior.    Single Item Drug Screening:  How often have you used an illegal drug (including marijuana) or a prescription medication for non-medical reasons in the past year? never    Single Item Drug Screen Score: 0  Interpretation: Negative screen for possible drug use disorder    Brief Intervention  Alcohol & drug use screenings were reviewed. No concerns regarding substance use disorder identified.     Social Drivers of Health     Financial Resource Strain: Low Risk  (12/8/2023)    Overall Financial Resource Strain (CARDIA)     Difficulty of Paying Living Expenses: Not very hard   Food Insecurity: No Food Insecurity (5/29/2025)    Nursing - Inadequate Food Risk Classification     Worried About Running Out of Food in the Last Year: Never true     Ran Out of Food in the Last Year: Never true   Transportation Needs: No Transportation Needs (5/29/2025)    PRAPARE - Transportation     Lack of Transportation (Medical): No     Lack of Transportation (Non-Medical): No   Housing Stability: Low Risk  (5/29/2025)    Housing Stability Vital Sign     Unable to Pay for Housing in the Last Year: No     Number of Times Moved in the Last Year: 0     Homeless in the Last Year: No   Utilities: Not At Risk (5/29/2025)    [facility] Utilities     Threatened with loss of utilities: No     No results found.    Objective   /80   Pulse 68   Ht 5' 6\" (1.676 m)   Wt 57.6 kg (127 lb)   SpO2 97%   BMI 20.50 kg/m²     Physical Exam  Vitals and nursing note reviewed.   Constitutional:       Appearance: He is well-developed.   HENT:      Head: Normocephalic and atraumatic.     Eyes:      Conjunctiva/sclera: Conjunctivae normal.      Pupils: Pupils are equal, round, and reactive to " light.       Cardiovascular:      Rate and Rhythm: Normal rate and regular rhythm.      Pulses: no weak pulses.           Dorsalis pedis pulses are 2+ on the right side and 2+ on the left side.      Heart sounds: Normal heart sounds.   Pulmonary:      Effort: Pulmonary effort is normal.      Breath sounds: Normal breath sounds. No wheezing or rales.   Abdominal:      General: Bowel sounds are normal. There is no distension.      Palpations: Abdomen is soft.      Tenderness: There is no abdominal tenderness.     Musculoskeletal:         General: No tenderness. Normal range of motion.      Cervical back: Normal range of motion and neck supple.   Feet:      Right foot:      Skin integrity: Callus and dry skin present. No ulcer, skin breakdown, erythema or warmth.      Left foot:      Skin integrity: Callus and dry skin present. No ulcer, skin breakdown, erythema or warmth.     Skin:     General: Skin is warm and dry.      Findings: No rash.     Neurological:      Mental Status: He is alert and oriented to person, place, and time.      Cranial Nerves: No cranial nerve deficit.      Sensory: No sensory deficit.      Coordination: Coordination normal.     Psychiatric:         Behavior: Behavior normal.         Thought Content: Thought content normal.         Judgment: Judgment normal.           Patient's shoes and socks removed.    Right Foot/Ankle   Right Foot Inspection  Skin Exam: skin normal, skin intact, dry skin, callus and callus. No warmth, no erythema, no maceration, no abnormal color, no pre-ulcer and no ulcer.     Toe Exam: ROM and strength within normal limits.     Sensory   Monofilament testing: intact    Vascular  Capillary refills: < 3 seconds  The right DP pulse is 2+.     Left Foot/Ankle  Left Foot Inspection  Skin Exam: skin normal, skin intact, dry skin and callus. No warmth, no erythema, no maceration, normal color, no pre-ulcer and no ulcer.     Toe Exam: ROM and strength within normal limits.      Sensory   Monofilament testing: intact    Vascular  Capillary refills: < 3 seconds  The left DP pulse is 2+.     Assign Risk Category  No deformity present  No loss of protective sensation  No weak pulses  Risk: 0

## 2025-05-30 ENCOUNTER — RESULTS FOLLOW-UP (OUTPATIENT)
Dept: FAMILY MEDICINE CLINIC | Facility: CLINIC | Age: 75
End: 2025-05-30

## 2025-07-24 ENCOUNTER — VBI (OUTPATIENT)
Dept: ADMINISTRATIVE | Facility: OTHER | Age: 75
End: 2025-07-24

## 2025-07-24 NOTE — TELEPHONE ENCOUNTER
07/24/25 11:59 AM     Chart reviewed for CRC: Colonoscopy ; nothing is submitted to the patient's insurance at this time.     Lynn Salcedo   [facility]

## 2025-08-01 ENCOUNTER — VBI (OUTPATIENT)
Dept: ADMINISTRATIVE | Facility: OTHER | Age: 75
End: 2025-08-01

## (undated) DEVICE — INTENDED FOR TISSUE SEPARATION, AND OTHER PROCEDURES THAT REQUIRE A SHARP SURGICAL BLADE TO PUNCTURE OR CUT.: Brand: BARD-PARKER SAFETY BLADES SIZE 10, STERILE

## (undated) DEVICE — PADS GROUND

## (undated) DEVICE — GLOVE INDICATOR PI UNDERGLOVE SZ 7.5 BLUE

## (undated) DEVICE — SUT VICRYL 3-0 SH 27 IN J416H

## (undated) DEVICE — INTENDED FOR TISSUE SEPARATION, AND OTHER PROCEDURES THAT REQUIRE A SHARP SURGICAL BLADE TO PUNCTURE OR CUT.: Brand: BARD-PARKER SAFETY BLADES SIZE 15, STERILE

## (undated) DEVICE — GLOVE INDICATOR PI UNDERGLOVE SZ 6.5 BLUE

## (undated) DEVICE — STRL PENROSE DRAIN 18" X 1/4": Brand: CARDINAL HEALTH

## (undated) DEVICE — SUT PROLENE 2-0 CT-2 30 IN 8411H

## (undated) DEVICE — GLOVE PI ULTRA TOUCH SZ.6.5

## (undated) DEVICE — SUT VICRYL 2-0 18 IN J911T

## (undated) DEVICE — SYRINGE 10ML LL

## (undated) DEVICE — BETHLEHEM UNIVERSAL MINOR GEN: Brand: CARDINAL HEALTH

## (undated) DEVICE — TUBING SUCTION 5MM X 12 FT

## (undated) DEVICE — SUT SILK 2-0 SH 30 IN K833H

## (undated) DEVICE — SUT VICRYL 2-0 SH 27 IN UNDYED J417H

## (undated) DEVICE — SUT MONOCRYL 4-0 PS-2 18 IN Y496G

## (undated) DEVICE — NEEDLE 25G X 1 1/2

## (undated) DEVICE — ADHESIVE SKIN HIGH VISCOSITY EXOFIN 1ML

## (undated) DEVICE — ANTIBACTERIAL UNDYED BRAIDED (POLYGLACTIN 910), SYNTHETIC ABSORBABLE SUTURE: Brand: COATED VICRYL

## (undated) DEVICE — GLOVE SRG BIOGEL 7.5

## (undated) DEVICE — CHLORAPREP HI-LITE 26ML ORANGE

## (undated) DEVICE — NEPTUNE E-SEP SMOKE EVACUATION PENCIL, COATED, 70MM BLADE, PUSH BUTTON SWITCH: Brand: NEPTUNE E-SEP

## (undated) DEVICE — POOLE SUCTION HANDLE: Brand: CARDINAL HEALTH